# Patient Record
Sex: FEMALE | Race: WHITE | NOT HISPANIC OR LATINO | Employment: FULL TIME | URBAN - METROPOLITAN AREA
[De-identification: names, ages, dates, MRNs, and addresses within clinical notes are randomized per-mention and may not be internally consistent; named-entity substitution may affect disease eponyms.]

---

## 2017-01-18 ENCOUNTER — HOSPITAL ENCOUNTER (OUTPATIENT)
Dept: MAMMOGRAPHY | Facility: MEDICAL CENTER | Age: 52
Discharge: HOME | End: 2017-01-18
Payer: COMMERCIAL

## 2017-01-18 DIAGNOSIS — Z12.31 ENCOUNTER FOR SCREENING MAMMOGRAM FOR MALIGNANT NEOPLASM OF BREAST: ICD-10-CM

## 2017-01-18 PROCEDURE — G0202 SCR MAMMO BI INCL CAD: HCPCS

## 2017-01-18 PROCEDURE — 77063 BREAST TOMOSYNTHESIS BI: CPT

## 2017-03-03 ENCOUNTER — ALLSCRIPTS OFFICE VISIT (OUTPATIENT)
Dept: OTHER | Facility: OTHER | Age: 52
End: 2017-03-03

## 2017-03-03 DIAGNOSIS — Z12.31 ENCOUNTER FOR SCREENING MAMMOGRAM FOR MALIGNANT NEOPLASM OF BREAST: ICD-10-CM

## 2017-03-09 ENCOUNTER — LAB CONVERSION - ENCOUNTER (OUTPATIENT)
Dept: OTHER | Facility: OTHER | Age: 52
End: 2017-03-09

## 2017-03-09 LAB
ADDITIONAL INFORMATION (HISTORICAL): ABNORMAL
ADEQUACY: (HISTORICAL): ABNORMAL
COMMENT (HISTORICAL): ABNORMAL
CYTOTECHNOLOGIST: (HISTORICAL): ABNORMAL
GENERAL CATEGORIZATION (HISTORICAL): ABNORMAL
HPV MRNA E6/E7 (HISTORICAL): NOT DETECTED
INTERPRETATION (HISTORICAL): ABNORMAL
LMP (HISTORICAL): ABNORMAL
PATHOLOGIST (HISTORICAL): ABNORMAL
PREV. BX: (HISTORICAL): ABNORMAL
PREV. PAP (HISTORICAL): ABNORMAL
SOURCE (HISTORICAL): ABNORMAL

## 2018-01-14 VITALS
DIASTOLIC BLOOD PRESSURE: 74 MMHG | WEIGHT: 163 LBS | HEIGHT: 67 IN | SYSTOLIC BLOOD PRESSURE: 118 MMHG | BODY MASS INDEX: 25.58 KG/M2

## 2018-02-12 ENCOUNTER — TRANSCRIBE ORDERS (OUTPATIENT)
Dept: RADIOLOGY | Facility: CLINIC | Age: 53
End: 2018-02-12

## 2018-02-15 ENCOUNTER — HOSPITAL ENCOUNTER (OUTPATIENT)
Dept: RADIOLOGY | Age: 53
Discharge: HOME | End: 2018-02-15
Payer: COMMERCIAL

## 2018-02-15 DIAGNOSIS — Z12.31 ENCOUNTER FOR SCREENING MAMMOGRAM FOR MALIGNANT NEOPLASM OF BREAST: ICD-10-CM

## 2018-02-15 PROCEDURE — 77067 SCR MAMMO BI INCL CAD: CPT

## 2018-02-15 PROCEDURE — 77063 BREAST TOMOSYNTHESIS BI: CPT

## 2018-12-03 ENCOUNTER — HOSPITAL ENCOUNTER (OUTPATIENT)
Dept: RADIOLOGY | Facility: HOSPITAL | Age: 53
Discharge: HOME | End: 2018-12-03
Attending: ORTHOPAEDIC SURGERY
Payer: COMMERCIAL

## 2018-12-03 ENCOUNTER — OFFICE VISIT (OUTPATIENT)
Dept: OBGYN CLINIC | Facility: HOSPITAL | Age: 53
End: 2018-12-03
Payer: COMMERCIAL

## 2018-12-03 VITALS
HEART RATE: 64 BPM | DIASTOLIC BLOOD PRESSURE: 69 MMHG | HEIGHT: 67 IN | SYSTOLIC BLOOD PRESSURE: 116 MMHG | BODY MASS INDEX: 23.23 KG/M2 | WEIGHT: 148 LBS

## 2018-12-03 DIAGNOSIS — M25.512 ACUTE PAIN OF LEFT SHOULDER: Primary | ICD-10-CM

## 2018-12-03 DIAGNOSIS — M25.512 ACUTE PAIN OF LEFT SHOULDER: ICD-10-CM

## 2018-12-03 DIAGNOSIS — M75.102 TEAR OF LEFT ROTATOR CUFF, UNSPECIFIED TEAR EXTENT: ICD-10-CM

## 2018-12-03 PROCEDURE — 73030 X-RAY EXAM OF SHOULDER: CPT

## 2018-12-03 PROCEDURE — 99203 OFFICE O/P NEW LOW 30 MIN: CPT | Performed by: ORTHOPAEDIC SURGERY

## 2018-12-03 RX ORDER — METOPROLOL SUCCINATE 50 MG/1
TABLET, EXTENDED RELEASE ORAL
COMMUNITY
End: 2019-04-29 | Stop reason: SDUPTHER

## 2018-12-03 RX ORDER — ERGOCALCIFEROL (VITAMIN D2) 10 MCG
TABLET ORAL
COMMUNITY

## 2018-12-03 RX ORDER — FLUTICASONE PROPIONATE 50 MCG
SPRAY, SUSPENSION (ML) NASAL
COMMUNITY

## 2018-12-03 RX ORDER — FLUTICASONE PROPIONATE 50 MCG
SPRAY, SUSPENSION (ML) NASAL
Refills: 5 | COMMUNITY
Start: 2018-11-21 | End: 2020-01-28 | Stop reason: SDUPTHER

## 2018-12-03 RX ORDER — LEVOTHYROXINE SODIUM 0.12 MG/1
TABLET ORAL
COMMUNITY
End: 2020-01-28 | Stop reason: DRUGHIGH

## 2018-12-03 NOTE — PROGRESS NOTES
Assessment  left shoulder impingement syndrome  Discussion and Plan:    A mri of the left shoulder was ordered  If mri is denied I will refer to physical therapy and perform a corticosteroid injection of the left shoulder  I will see her back in the office after the mri  Subjective:   Patient ID: Mark Gold is a 46 y o  female in the office for left shoulder pain x 6 months  She was diagnosed with partial rotator cuff tears x 10 years ago which she had two stents of physical therapy previously which relieved her symptoms  She states pain increases at night, usually relieved with rest and HEP  Pain level at worse is 5/10 and at best 0/10  The pain is sharp is localized left glenohumeral joint and is intermittent timing      HPI        The following portions of the patient's history were reviewed and updated as appropriate: allergies, current medications, past family history, past medical history, past social history, past surgical history and problem list     Review of Systems   Constitutional: Negative  HENT: Negative  Eyes: Negative  Respiratory: Negative  Cardiovascular: Negative  Gastrointestinal: Negative  Endocrine: Negative  Genitourinary: Negative  Musculoskeletal: Positive for arthralgias and myalgias  Skin: Negative  Allergic/Immunologic: Negative  Hematological: Negative  Psychiatric/Behavioral: Negative  Objective:  Left Shoulder Exam     Tenderness   The patient is experiencing tenderness in the [facility] joint, acromion      Range of Motion   Active Abduction:                       160  Forward Flexion:                        170  External Rotation:                      80  Internal Rotation 0 degrees:      Lumbar    Muscle Strength   Abduction:            4/5  Internal Rotation:  4/5  External Rotation: 4/5  Supraspinatus:     4/5  Subscapularis:     4/5    Tests   Impingement:   Positive  Dotson:          Positive          Physical Exam Constitutional: She is oriented to person, place, and time  She appears well-developed  HENT:   Head: Normocephalic  Eyes: Conjunctivae are normal    Neck: Neck supple  Cardiovascular: Intact distal pulses  Pulmonary/Chest: Effort normal    Abdominal: She exhibits no distension  Musculoskeletal:   See left shoulder exam in note  Neurological: She is alert and oriented to person, place, and time  Skin: Skin is warm and dry  Psychiatric: She has a normal mood and affect  I have personally reviewed pertinent films in PACS and my interpretation is as follows  Xray right shoulder:  No fracture or dislocation  No degenerative changes present  Scribe Attestation    I,:   Paula Ha am acting as a scribe while in the presence of the attending physician :        I,:   Anand Mendes MD personally performed the services described in this documentation    as scribed in my presence :          The patient has an examination worrisome for a left shoulder rotator cuff tear and given her history of prior partial tears I am concerned that these may have progressed  Given her lack of improvement with the physical therapy exercises she has performed in therapy and on her own and the weakness she demonstrates on exam today I do recommend an MRI scan left shoulder to better assess the structural integrity of the rotator cuff and help guide me in treatment options

## 2018-12-04 DIAGNOSIS — Z86.59 HISTORY OF CLAUSTROPHOBIA: Primary | ICD-10-CM

## 2018-12-04 RX ORDER — LORAZEPAM 1 MG/1
1 TABLET ORAL
Qty: 1 TABLET | Refills: 0 | Status: SHIPPED | OUTPATIENT
Start: 2018-12-04 | End: 2020-01-28 | Stop reason: ALTCHOICE

## 2018-12-16 ENCOUNTER — HOSPITAL ENCOUNTER (OUTPATIENT)
Dept: RADIOLOGY | Facility: HOSPITAL | Age: 53
Discharge: HOME | End: 2018-12-16
Attending: ORTHOPAEDIC SURGERY
Payer: COMMERCIAL

## 2018-12-16 DIAGNOSIS — M75.102 TEAR OF LEFT ROTATOR CUFF, UNSPECIFIED TEAR EXTENT: ICD-10-CM

## 2018-12-16 PROCEDURE — 73221 MRI JOINT UPR EXTREM W/O DYE: CPT

## 2018-12-20 ENCOUNTER — OFFICE VISIT (OUTPATIENT)
Dept: OBGYN CLINIC | Facility: OTHER | Age: 53
End: 2018-12-20
Payer: COMMERCIAL

## 2018-12-20 VITALS — SYSTOLIC BLOOD PRESSURE: 118 MMHG | HEART RATE: 71 BPM | DIASTOLIC BLOOD PRESSURE: 70 MMHG

## 2018-12-20 DIAGNOSIS — M75.42 SUBACROMIAL IMPINGEMENT OF LEFT SHOULDER: Primary | ICD-10-CM

## 2018-12-20 PROCEDURE — 99214 OFFICE O/P EST MOD 30 MIN: CPT | Performed by: ORTHOPAEDIC SURGERY

## 2018-12-20 PROCEDURE — 20610 DRAIN/INJ JOINT/BURSA W/O US: CPT | Performed by: ORTHOPAEDIC SURGERY

## 2018-12-20 RX ORDER — BUPIVACAINE HYDROCHLORIDE 2.5 MG/ML
2 INJECTION, SOLUTION INFILTRATION; PERINEURAL
Status: COMPLETED | OUTPATIENT
Start: 2018-12-20 | End: 2018-12-20

## 2018-12-20 RX ORDER — BETAMETHASONE SODIUM PHOSPHATE AND BETAMETHASONE ACETATE 3; 3 MG/ML; MG/ML
6 INJECTION, SUSPENSION INTRA-ARTICULAR; INTRALESIONAL; INTRAMUSCULAR; SOFT TISSUE
Status: COMPLETED | OUTPATIENT
Start: 2018-12-20 | End: 2018-12-20

## 2018-12-20 RX ADMIN — BETAMETHASONE SODIUM PHOSPHATE AND BETAMETHASONE ACETATE 6 MG: 3; 3 INJECTION, SUSPENSION INTRA-ARTICULAR; INTRALESIONAL; INTRAMUSCULAR; SOFT TISSUE at 08:22

## 2018-12-20 RX ADMIN — BUPIVACAINE HYDROCHLORIDE 2 ML: 2.5 INJECTION, SOLUTION INFILTRATION; PERINEURAL at 08:22

## 2018-12-20 NOTE — PATIENT INSTRUCTIONS

## 2018-12-20 NOTE — PROGRESS NOTES
Assessment  Diagnoses and all orders for this visit:    Subacromial impingement of left shoulder        Discussion and Plan:    I reviewed with the patient the findings of the MRI scan do not concern me for surgical pathology, the interstitial tearing is really equated to tendinitis of the subscapularis and nonsurgical care is certainly indicated  Given her persistent symptoms and her examination consistent with impingement syndrome I did provide her with a subacromial injection with referral to physical therapy, the be happy to re-evaluate her in the future to consider diagnostic arthroscopy if things do not improve but I suspect she will be able to avoid surgery  She is happy with that information and will see her back in the future on an as-needed basis based on her response to the injection and therapy    Subjective:   Patient ID: Aime Mae is a 46 y o  female      The patient returns for follow-up of her left shoulder MRI scan  She had history of partial-thickness rotator cuff tears which were concerned may convert full-thickness tears, her symptoms are unchanged from her previous visit, she feels pain radiating down the lateral aspect of her arm to the deltoid origin but not below, she does state every now and then she has numbness and tingling in her small ring finger which she shakes her arm that does improve  The following portions of the patient's history were reviewed and updated as appropriate: allergies, current medications, past family history, past medical history, past social history, past surgical history and problem list     Review of Systems   Constitutional: Negative for chills and fever  HENT: Negative for hearing loss  Eyes: Negative for visual disturbance  Respiratory: Negative for shortness of breath  Cardiovascular: Negative for chest pain  Gastrointestinal: Negative for abdominal pain     Musculoskeletal:        As reviewed in the HPI   Skin: Negative for rash    Neurological:        As reviewed in the HPI   Psychiatric/Behavioral: Negative for agitation  Objective:  Left Shoulder Exam     Tenderness   None    Range of Motion   Passive Abduction:                    Normal  Forward Flexion:                        170  External Rotation:                      40  Internal Rotation 0 degrees:      Normal    Muscle Strength   Abduction:            4/5  Internal Rotation:  4/5  External Rotation: 4/5    Tests   Impingement:   Positive  Dotson:          Positive  Cross Arm:      Negative  Drop Arm:        Negative  Apprehension: Negative          Physical Exam   Constitutional: She is oriented to person, place, and time  She appears well-developed and well-nourished  HENT:   Head: Normocephalic and atraumatic  Neck: Normal range of motion  Neck supple  Cardiovascular: Normal rate and regular rhythm  Pulmonary/Chest: Effort normal  No respiratory distress  Abdominal: Soft  She exhibits no distension  Neurological: She is alert and oriented to person, place, and time  Skin: Skin is warm and dry  Psychiatric: She has a normal mood and affect  Her behavior is normal    Nursing note and vitals reviewed  I have personally reviewed pertinent films in PACS and my interpretation is as follows  MRI left shoulder shows no evidence of full-thickness or articular/bursal sided partial-thickness tear of the rotator cuff  There is some interstitial tendinitis/tearing of the subscapularis without disruption of the insertion  Long head of biceps is without instability     Mild subacromial bursitis and rotator cuff tendinitis is seen    Large joint arthrocentesis  Date/Time: 12/20/2018 8:22 AM  Consent given by: patient  Timeout: Immediately prior to procedure a time out was called to verify the correct patient, procedure, equipment, support staff and site/side marked as required   Supporting Documentation  Indications: pain   Procedure Details  Location: shoulder - L subacromial bursa  Preparation: Patient was prepped and draped in the usual sterile fashion  Needle size: 22 G  Approach: lateral  Medications administered: 2 mL bupivacaine 0 25 %; 6 mg betamethasone acetate-betamethasone sodium phosphate 6 (3-3) mg/mL    Patient tolerance: patient tolerated the procedure well with no immediate complications  Dressing:  Sterile dressing applied

## 2018-12-31 ENCOUNTER — EVALUATION (OUTPATIENT)
Dept: PHYSICAL THERAPY | Facility: REHABILITATION | Age: 53
End: 2018-12-31
Payer: COMMERCIAL

## 2018-12-31 DIAGNOSIS — M75.42 SUBACROMIAL IMPINGEMENT OF LEFT SHOULDER: Primary | ICD-10-CM

## 2018-12-31 PROCEDURE — G8985 CARRY GOAL STATUS: HCPCS

## 2018-12-31 PROCEDURE — 97140 MANUAL THERAPY 1/> REGIONS: CPT

## 2018-12-31 PROCEDURE — G8984 CARRY CURRENT STATUS: HCPCS

## 2018-12-31 PROCEDURE — 97162 PT EVAL MOD COMPLEX 30 MIN: CPT

## 2018-12-31 PROCEDURE — 97110 THERAPEUTIC EXERCISES: CPT

## 2018-12-31 NOTE — PROGRESS NOTES
PT Evaluation     Today's date: 2018  Patient name: Meng Silver  : 1965  MRN: [id number]  Referring provider: Randy Means MD  Dx:   Encounter Diagnosis     ICD-10-CM    1  Subacromial impingement of left shoulder M75 42 Ambulatory referral to Physical Therapy       Start Time: 1030  Stop Time: 1130  Total time in clinic (min): 60 minutes    Assessment  Assessment details: Patient is a 45 yo female presents with symptoms consistent with L shoulder impingement and biceps tendonitis that started a few months ago  Patient presents with palpable tenderness to the proximal biceps, distal supraspinatus and infraspinatus tendons  Patient also presents with poor posture mechanics, decreased L shoulder abduction ROM, and decreased muscular endurance  Patient has increased pain with ADLs, work related activities and sleeping  Patient noted improvements after manual techniques and exercises were performed  PT will address the noted impairments by performing eccentric strengthening, stretching, functional activities and manual techniques such as mobilizations and STM to allow the patient to return to her PLOF  PT recommended 2x/week for 4-6 weeks c a good prognosis 2* noted improvements after treatment  Impairments: abnormal or restricted ROM, activity intolerance, impaired physical strength, lacks appropriate home exercise program, pain with function, poor posture  and poor body mechanics    Symptom irritability: lowUnderstanding of Dx/Px/POC: good   Prognosis: good    Goals  STG: In four weeks the patient will:    1  Be (I) with her HEP  2  Increase scapular and L shoulder strength to 4/5 MMT score to assist c ADLs  3  Increase L shoulder abduction ROM to 180 degrees to assist c reaching and dressing  LTG: In six weeks, the patient will:    1  Increase FOTO score to 70 to demonstrate improvements in symptoms and function  2  Demonstrate full L shoulder AROM without pain    3  Perform 30 mins of activity without pain  4  Increase L shoulder and scapular strength to 5/5 MMT score to assist c lifting and reaching  Plan  Patient would benefit from: skilled physical therapy  Referral necessary: No  Planned modality interventions: cryotherapy and thermotherapy: hydrocollator packs  Planned therapy interventions: abdominal trunk stabilization, body mechanics training, home exercise program, therapeutic exercise, therapeutic activities, stretching, strengthening, postural training, patient education, neuromuscular re-education, Carpenter taping, massage, manual therapy and joint mobilization  Frequency: 2x week  Duration in visits: 12  Duration in weeks: 6  Plan of Care beginning date: 2018  Plan of Care expiration date: 2019  Treatment plan discussed with: patient        Subjective Evaluation    History of Present Illness  Onset date: Summer 2018  Mechanism of injury: Patient presented to therapy with L shoulder pain  Patient noted that she has had three rounds of therapy for the shoulder and noted the pain started to increase in Summer 2018  Patient noted she started to perform more yard work for her parents and noted an increase in pain  On 18 she had a cortisone injection and patient noted a relief in the shoulder, however it is coming back  She also noted increased neck pain after sleeping on her L shoulder and noted numbness in L 4th and 5th digits  Patient noted increased pain with ADLs, sleeping, moving the mouse at work and cleaning     Recurrent probem    Quality of life: good    Pain  Current pain ratin  At best pain ratin (18-18 2* injection)  At worst pain ratin  Location: anterior L shoulder to biceps to lateral epicondyle  Quality: dull ache  Relieving factors: medications and rest  Aggravating factors: overhead activity and lifting  Progression: worsening (loosing strength in the L shoulder)    Social Support  Steps to enter house: yes (2 [address])  Stairs in house: yes (bedroom on 2nd floor)   Lives in: multiple-level home  Lives with: spouse and adult children    Employment status: working ([address] )  Hand dominance: left  Exercise history: Walk everyday for 30-45 mins      Diagnostic Tests  MRI studies: normal (biceps tedonitis)  Treatments  Previous treatment: physical therapy and medication  Patient Goals  Patient goals for therapy: decreased pain, increased motion, increased strength, independence with ADLs/IADLs and return to sport/leisure activities  Patient goal: "to get stronger and no pain with ADLs "        Objective     Palpation   Left   Tenderness of the biceps, infraspinatus, pectoralis major, pectoralis minor, supraspinatus, teres major and upper trapezius  Trigger point to biceps  Left Shoulder Comments  Left biceps: proximal  Left supraspinatus: distal      Tenderness     Left Shoulder   Tenderness in the biceps tendon (proximal), coracoid process, infraspinatus tendon and supraspinatus tendon       Cervical/Thoracic Screen   Cervical range of motion within normal limits with the following exceptions: Flexion: 100%  Extension: 100%  Rotation: 100%  Sidebendin% pain on L    Neurological Testing     Sensation     Shoulder   Left Shoulder   Intact: light touch    Right Shoulder   Intact: light touch    Active Range of Motion   Left Shoulder   Flexion: 180 degrees   Abduction: 160 degrees   External rotation 45°: 90 degrees   Internal rotation 45°: 70 degrees     Right Shoulder   Normal active range of motion    Strength/Myotome Testing     Left Shoulder     Planes of Motion   Flexion: 4-   Extension: 4   Abduction: 3 (Pain)   External rotation at 0°: 3+   Internal rotation at 0°: 3+ (Pain)     Isolated Muscles   Biceps: 4-   Triceps: 4 (Pain)     Right Shoulder     Planes of Motion   Flexion: 4+   Extension: 4+   Abduction: 4+   External rotation at 0°: 4   Internal rotation at 0°: 4     Isolated Muscles   Biceps: 4+ Triceps: 4+     Tests     Left Shoulder   Positive belly press, empty can, lift-off and Neer's  Negative full can and Raghu's sign         Flowsheet Rows      Most Recent Value   PT/OT G-Codes   Current Score  59   Projected Score  70   FOTO information reviewed  Yes   Assessment Type  Evaluation   G code set  Carrying, Moving & Handling Objects   Carrying, Moving and Handling Objects Current Status ()  CK   Carrying, Moving and Handling Objects Goal Status ()  CJ          Precautions: none    Daily Treatment Diary     Manual  12/31            STM to proximal biceps tendon and post cap 5'            L shoulder distraction and post mob 5'                                                       Exercise Diary  12/31                         UBE nv            pec stretch 3x30"            Shoulder isometrics: ER, IR, Flex, Ext, abd 2x10, 5" hold            Scapular retraction seated X20, 5"            Chin tucks nv            UT stretch 3x30"            Levator scap stretch nv            Rows and extensions nv            sidelying ER nv            Band IR/ER nv            Seated ball roll outs (stretch into shoulder flexion) nv                                                                                                                        Modalities  12/31            HP/CP PRN np

## 2019-01-03 ENCOUNTER — OFFICE VISIT (OUTPATIENT)
Dept: PHYSICAL THERAPY | Facility: REHABILITATION | Age: 54
End: 2019-01-03
Payer: COMMERCIAL

## 2019-01-03 DIAGNOSIS — M75.42 SUBACROMIAL IMPINGEMENT OF LEFT SHOULDER: Primary | ICD-10-CM

## 2019-01-03 PROCEDURE — 97110 THERAPEUTIC EXERCISES: CPT

## 2019-01-03 PROCEDURE — 97140 MANUAL THERAPY 1/> REGIONS: CPT

## 2019-01-03 PROCEDURE — 97112 NEUROMUSCULAR REEDUCATION: CPT

## 2019-01-08 ENCOUNTER — OFFICE VISIT (OUTPATIENT)
Dept: PHYSICAL THERAPY | Facility: REHABILITATION | Age: 54
End: 2019-01-08
Payer: COMMERCIAL

## 2019-01-08 DIAGNOSIS — M75.42 SUBACROMIAL IMPINGEMENT OF LEFT SHOULDER: Primary | ICD-10-CM

## 2019-01-08 PROCEDURE — 97110 THERAPEUTIC EXERCISES: CPT

## 2019-01-08 PROCEDURE — 97140 MANUAL THERAPY 1/> REGIONS: CPT

## 2019-01-08 PROCEDURE — 97112 NEUROMUSCULAR REEDUCATION: CPT

## 2019-01-08 NOTE — PROGRESS NOTES
Daily Note     Today's date: 2019  Patient name: Cathy Feliz  : 1965  MRN: [id number]  Referring provider: Oliverio Reyez MD  Dx:   Encounter Diagnosis     ICD-10-CM    1  Subacromial impingement of left shoulder M75 42                   Subjective: Patient states that her shoulder feels the same, increased pain during the night and upon waking  Objective: See treatment diary below  Precautions: none     Daily Treatment Diary      Manual  12/31  1/3                   STM to proximal biceps tendon and post cap 5'                     L shoulder distraction and post mob 5'                      L PROM   HS                    medial n  glides    HS                  ulnar nerve glides     HS                       Exercise Diary  12/31  1/3  1/8                                         UBE nv  3'/3'                   pec stretch 3x30"                     Shoulder isometrics: ER, IR, Flex, Ext, abd 2x10, 5" hold                     Scapular retraction seated X20, 5"                     Chin tucks nv    5" 2x10                 UT stretch 3x30"  30"x3                   Levator scap stretch nv  30"x3                   Rows and extensions nv  OTB 2x10 ea  OTB 2x10 ea                 sidelying ER nv  5"  2x10                   Band IR/ER nv  OTB 2x10                   Seated ball roll outs (stretch into shoulder flexion) nv                      T's, Y's, I's     2x10 ea                  B/L ER w/ TB     OTB 5" 2x10                  medial n  glides     x10                                                                                                                                               Modalities                       HP/CP PRN np                                                                              Assessment: Good tolerance for new TE added today  Plan: Continue per plan of care

## 2019-01-10 ENCOUNTER — OFFICE VISIT (OUTPATIENT)
Dept: PHYSICAL THERAPY | Facility: REHABILITATION | Age: 54
End: 2019-01-10
Payer: COMMERCIAL

## 2019-01-10 DIAGNOSIS — M75.42 SUBACROMIAL IMPINGEMENT OF LEFT SHOULDER: Primary | ICD-10-CM

## 2019-01-10 PROCEDURE — 97112 NEUROMUSCULAR REEDUCATION: CPT

## 2019-01-10 PROCEDURE — 97140 MANUAL THERAPY 1/> REGIONS: CPT

## 2019-01-10 PROCEDURE — 97110 THERAPEUTIC EXERCISES: CPT

## 2019-01-10 NOTE — PROGRESS NOTES
Daily Note     Today's date: 1/10/2019  Patient name: Corinna Halsted  : 1965  MRN: [id number]  Referring provider: Jose Pearson MD  Dx:   Encounter Diagnosis     ICD-10-CM    1  Subacromial impingement of left shoulder M75 42                   Subjective: Patient reports soreness after LV but today was the first day she woke up without tingling and numbness in 4th & 5th digit         Objective: See treatment diary below  Precautions: none     Daily Treatment Diary      Manual  12/31  1/3  1/8 1/10                STM to proximal biceps tendon and post cap 5'                     L shoulder distraction and post mob 5'                      L PROM   HS                    medial n  glides    HS  HS                ulnar nerve glides     HS  HS                     Exercise Diary  12/31  1/3  1/8  1/10                                       UBE nv  3'/3'  3'/3'  3'/3'               pec stretch 3x30"                     Shoulder isometrics: ER, IR, Flex, Ext, abd 2x10, 5" hold                     Scapular retraction seated X20, 5"                     Chin tucks nv    5" 2x10  5" 2x10               UT stretch 3x30"  30"x3                   Levator scap stretch nv  30"x3                   Rows and extensions nv  OTB 2x10 ea  OTB 2x10 ea  OTB 3x10 ea               sidelying ER nv  5"  2x10                   Band IR/ER nv  OTB 2x10                   Seated ball roll outs (stretch into shoulder flexion) nv                      T's, Y's, I's     2x10 ea 3x10                B/L ER w/ TB     OTB 5" 2x10  OTB  5" 2x10                medial n  glides     x10  x10                scap punches       3" 3x10                                                                                                                     Modalities                       HP/CP PRN np                                                                              Assessment: Reports soreness post session, no c/o increased sx/       Plan: Continue per plan of care

## 2019-01-15 ENCOUNTER — OFFICE VISIT (OUTPATIENT)
Dept: PHYSICAL THERAPY | Facility: REHABILITATION | Age: 54
End: 2019-01-15
Payer: COMMERCIAL

## 2019-01-15 DIAGNOSIS — M75.42 SUBACROMIAL IMPINGEMENT OF LEFT SHOULDER: Primary | ICD-10-CM

## 2019-01-15 PROCEDURE — 97112 NEUROMUSCULAR REEDUCATION: CPT

## 2019-01-15 PROCEDURE — 97110 THERAPEUTIC EXERCISES: CPT

## 2019-01-15 PROCEDURE — 97140 MANUAL THERAPY 1/> REGIONS: CPT

## 2019-01-15 NOTE — PROGRESS NOTES
Daily Note     Today's date: 1/15/2019  Patient name: Corinna Halsted  : 1965  MRN: [id number]  Referring provider: Jose Pearson MD  Dx:   Encounter Diagnosis     ICD-10-CM    1  Subacromial impingement of left shoulder M75 42                   Subjective: Patient reports that her shoulder has been feeling pretty good, no new complaints  Objective: See treatment diary below  Precautions: none     Daily Treatment Diary      Manual  12/31  1/3  1/8 1/10   1/15             STM to proximal biceps tendon and post cap 5'                     L shoulder distraction and post mob 5'                      L PROM   HS                    medial n  glides    HS  HS  HS              ulnar nerve glides     HS  HS  HS                   Exercise Diary  12/31  1/3  1/8  1/10  1/15                                     UBE nv  3'/3'  3'/3'  3'/3'  4'/4'             pec stretch 3x30"                     Shoulder isometrics: ER, IR, Flex, Ext, abd 2x10, 5" hold                     Scapular retraction seated X20, 5"                     Chin tucks nv    5" 2x10  5" 2x10  5" 3x10             UT stretch 3x30"  30"x3                   Levator scap stretch nv  30"x3                   Rows and extensions nv  OTB 2x10 ea  OTB 2x10 ea  OTB 3x10 ea  OTB  3x10 ea             sidelying ER nv  5"  2x10                   Band IR/ER nv  OTB 2x10                   Seated ball roll outs (stretch into shoulder flexion) nv                      T's, Y's, I's     2x10 ea 3x10  3x10              B/L ER w/ TB     OTB 5" 2x10  OTB  5" 2x10  OTB  5" 2x10              medial n  glides     x10  x10  x10              scap punches       3" 3x10  2# 3"  3x10                                                                                                                   Modalities                       HP/CP PRN np                                                                              Assessment: Patient is making good progress         Plan: Continue per plan of care

## 2019-01-17 ENCOUNTER — OFFICE VISIT (OUTPATIENT)
Dept: PHYSICAL THERAPY | Facility: REHABILITATION | Age: 54
End: 2019-01-17
Payer: COMMERCIAL

## 2019-01-17 DIAGNOSIS — M75.42 SUBACROMIAL IMPINGEMENT OF LEFT SHOULDER: Primary | ICD-10-CM

## 2019-01-17 PROCEDURE — G8990 OTHER PT/OT CURRENT STATUS: HCPCS

## 2019-01-17 PROCEDURE — 97110 THERAPEUTIC EXERCISES: CPT

## 2019-01-17 PROCEDURE — 97112 NEUROMUSCULAR REEDUCATION: CPT

## 2019-01-17 PROCEDURE — G8991 OTHER PT/OT GOAL STATUS: HCPCS

## 2019-01-17 PROCEDURE — 97140 MANUAL THERAPY 1/> REGIONS: CPT

## 2019-01-17 NOTE — PROGRESS NOTES
Daily Note     Today's date: 2019  Patient name: Anastacio Bailey  : 1965  MRN: [id number]  Referring provider: Zenon Hernandes MD  Dx:   Encounter Diagnosis     ICD-10-CM    1  Subacromial impingement of left shoulder M75 42                   Subjective: Patient reports that her shoulder is a little stiff in the morning but it loosens up  Objective: See treatment diary below  Precautions: none     Daily Treatment Diary      Manual  12/31  1/3  1/8 1/10   1/15  1/17           STM to proximal biceps tendon and post cap 5'                     L shoulder distraction and post mob 5'                      L PROM   HS                    medial n  glides    HS  HS  HS  HS            ulnar nerve glides     HS  HS  HS  HS                 Exercise Diary  12/31  1/3  1/8  1/10  1/15  1/17                                   UBE nv  3'/3'  3'/3'  3'/3'  4'/4'  4'/4'           pec stretch 3x30"                     Chin tucks nv    5" 2x10  5" 2x10  5" 3x10  5" 2x10           UT stretch 3x30"  30"x3                   Levator scap stretch nv  30"x3                   Rows and extensions nv  OTB 2x10 ea  OTB 2x10 ea  OTB 3x10 ea  OTB  3x10 ea  GTB  3x10 ea           sidelying ER nv  5"  2x10                   Band IR/ER nv  OTB 2x10                   Seated ball roll outs (stretch into shoulder flexion) nv                      T's, Y's, I's     2x10 ea 3x10  3x10  3x10            B/L ER w/ TB     OTB 5" 2x10  OTB  5" 2x10  OTB  5" 2x10  OTB 5"  2x10            medial n  glides     x10  x10  x10  x10            scap punches       3" 3x10  2# 3"  3x10  2# 3" 3x10                                                                                                                 Modalities                       HP/CP PRN np                                                                              Assessment: Good tolerance for all TE as listed, reports no changes in sx during or post tx         Plan: Continue per plan of care

## 2019-01-22 ENCOUNTER — OFFICE VISIT (OUTPATIENT)
Dept: PHYSICAL THERAPY | Facility: REHABILITATION | Age: 54
End: 2019-01-22
Payer: COMMERCIAL

## 2019-01-22 DIAGNOSIS — M75.42 SUBACROMIAL IMPINGEMENT OF LEFT SHOULDER: Primary | ICD-10-CM

## 2019-01-22 PROCEDURE — 97112 NEUROMUSCULAR REEDUCATION: CPT

## 2019-01-22 PROCEDURE — 97110 THERAPEUTIC EXERCISES: CPT

## 2019-01-22 PROCEDURE — 97140 MANUAL THERAPY 1/> REGIONS: CPT

## 2019-01-22 NOTE — PROGRESS NOTES
Daily Note     Today's date: 2019  Patient name: Linh Sesay  : 1965  MRN: [id number]  Referring provider: Lazarus Hake, MD  Dx:   Encounter Diagnosis     ICD-10-CM    1  Subacromial impingement of left shoulder M75 42                   Subjective: Pt reports feeling sore before Tx due to shoveling snow  Patient treated by Anson Avitia under my direct supervision, Pennie Loya, PTA  Objective: See treatment diary below  Precautions: none     Daily Treatment Diary      Manual  12/31  1/3  1/8 1/10   1/15  1/17  1/21         STM to proximal biceps tendon and post cap 5'                     L shoulder distraction and post mob 5'                      L PROM   HS                    medial n  glides    HS  HS  HS  HS  HS          ulnar nerve glides     HS  HS  HS  HS  HS               Exercise Diary  12/31  1/3  1/8  1/10  1/15  1/17  1/21                                 UBE nv  3'/3'  3'/3'  3'/3'  4'/4'  4'/4'  4'/4'         pec stretch 3x30"            3x30"         Chin tucks nv    5" 2x10  5" 2x10  5" 3x10  5" 2x10  5"   2x10         UT stretch 3x30"  30"x3                   Levator scap stretch nv  30"x3                   Rows and extensions nv  OTB 2x10 ea  OTB 2x10 ea  OTB 3x10 ea  OTB  3x10 ea  GTB  3x10 ea GTB  3x10  ea         sidelying ER nv  5"  2x10                   Band IR/ER nv  OTB 2x10                    T's, Y's, I's     2x10 ea 3x10  3x10  3x10  3x10  ea          B/L ER w/ TB     OTB 5" 2x10  OTB  5" 2x10  OTB  5" 2x10  OTB 5"  2x10  OTB  5"  2x10          medial n  glides     x10  x10  x10  x10  x10          scap punches       3" 3x10  2# 3"  3x10  2# 3" 3x10  3# 3"  3x10                                                                                                               Modalities                       HP/CP PRN np                                                                              Assessment: Pt progressed to a 3# weight for scap punches  Pt instructed in ulnar glides to perform at home  Pt had no exacerbation of pain during Tx  Plan: Continue per plan of care

## 2019-01-24 ENCOUNTER — EVALUATION (OUTPATIENT)
Dept: PHYSICAL THERAPY | Facility: REHABILITATION | Age: 54
End: 2019-01-24
Payer: COMMERCIAL

## 2019-01-24 DIAGNOSIS — M75.42 SUBACROMIAL IMPINGEMENT OF LEFT SHOULDER: Primary | ICD-10-CM

## 2019-01-24 PROCEDURE — 97110 THERAPEUTIC EXERCISES: CPT | Performed by: PHYSICAL THERAPIST

## 2019-01-24 PROCEDURE — 97140 MANUAL THERAPY 1/> REGIONS: CPT | Performed by: PHYSICAL THERAPIST

## 2019-01-24 NOTE — PROGRESS NOTES
Daily Note     Today's date: 2019  Patient name: Joselin Reza  : 1965  MRN: [id number]  Referring provider: Aleena Hatch MD  Dx:   No diagnosis found  Subjective: Reports continued improvement in UE numbness  Objective: See treatment diary below  Precautions: none     Daily Treatment Diary      Manual  12/31  1/3  1/8 1/10   1/15  1/17  1/21  1/24       STM to proximal biceps tendon and post cap 5'                     L shoulder distraction and post mob 5'                      L PROM   HS            SAHIL        medial n  glides    HS  HS  HS  HS  HS SAHIL        ulnar nerve glides     HS  HS  HS  HS  HS  SAHIL             Exercise Diary  12/31  1/3  1/8  1/10  1/15  1/17  1/21  1/24                               UBE nv  3'/3'  3'/3'  3'/3'  4'/4'  4'/4'  4'/4' 10'       pec stretch 3x30"            3x30"         Chin tucks nv    5" 2x10  5" 2x10  5" 3x10  5" 2x10  5"   2x10  5" 2x10       UT stretch 3x30"  30"x3                   Levator scap stretch nv  30"x3                   Rows and extensions nv  OTB 2x10 ea  OTB 2x10 ea  OTB 3x10 ea  OTB  3x10 ea  GTB  3x10 ea GTB  3x10  ea  GTB 3x10 ea       sidelying ER nv  5"  2x10            2# 2x10       Band IR/ER nv  OTB 2x10                    T's, Y's, I's     2x10 ea 3x10  3x10  3x10  3x10  ea          B/L ER w/ TB     OTB 5" 2x10  OTB  5" 2x10  OTB  5" 2x10  OTB 5"  2x10  OTB  5"  2x10  OTB 5" 2x10        medial n  glides     x10  x10  x10  x10  x10  x10        scap punches       3" 3x10  2# 3"  3x10  2# 3" 3x10  3# 3"  3x10  3# 3x10                                                                                                             Modalities                       HP/CP PRN np                                                                              Assessment: Progressing well with all aspects  Plan: Continue per plan of care

## 2019-01-29 ENCOUNTER — OFFICE VISIT (OUTPATIENT)
Dept: PHYSICAL THERAPY | Facility: REHABILITATION | Age: 54
End: 2019-01-29
Payer: COMMERCIAL

## 2019-01-29 DIAGNOSIS — M75.42 SUBACROMIAL IMPINGEMENT OF LEFT SHOULDER: Primary | ICD-10-CM

## 2019-01-29 PROCEDURE — 97110 THERAPEUTIC EXERCISES: CPT

## 2019-01-29 PROCEDURE — 97112 NEUROMUSCULAR REEDUCATION: CPT

## 2019-01-29 PROCEDURE — 97140 MANUAL THERAPY 1/> REGIONS: CPT

## 2019-01-31 ENCOUNTER — OFFICE VISIT (OUTPATIENT)
Dept: PHYSICAL THERAPY | Facility: REHABILITATION | Age: 54
End: 2019-01-31
Payer: COMMERCIAL

## 2019-01-31 ENCOUNTER — APPOINTMENT (OUTPATIENT)
Dept: PHYSICAL THERAPY | Facility: REHABILITATION | Age: 54
End: 2019-01-31
Payer: COMMERCIAL

## 2019-01-31 DIAGNOSIS — M75.42 SUBACROMIAL IMPINGEMENT OF LEFT SHOULDER: Primary | ICD-10-CM

## 2019-01-31 PROCEDURE — 97110 THERAPEUTIC EXERCISES: CPT

## 2019-01-31 PROCEDURE — 97140 MANUAL THERAPY 1/> REGIONS: CPT

## 2019-01-31 PROCEDURE — 97112 NEUROMUSCULAR REEDUCATION: CPT

## 2019-01-31 NOTE — PROGRESS NOTES
Daily Note     Today's date: 2019  Patient name: Hernan Vogt  : 1965  MRN: [id number]  Referring provider: Marisela Mccauley MD  Dx:   Encounter Diagnosis     ICD-10-CM    1  Subacromial impingement of left shoulder M75 42                   Subjective: Patient reports that her shoulder is feeling really good today  Pt reports that she was able to shovel snow without difficulty         Objective: See treatment diary below  Precautions: none     Daily Treatment Diary      Manual  12/31  1/3  1/8 1/10   1/15  1/17  1/21  1/24  1/29  1/31   STM to proximal biceps tendon and post cap 5'                     L shoulder distraction and post mob 5'                      L PROM   HS            SAHIL        medial n  glides    HS  HS  HS  HS  HS SAHIL  HS  AC    ulnar nerve glides     HS  HS  HS  HS  HS  SAHIL  HS [facility]         Exercise Diary  12/31  1/3  1/8  1/10  1/15  1/17  1/21  1/24 1/29  1/31                           UBE nv  3'/3'  3'/3'  3'/3'  4'/4'  4'/4'  4'/4' 10'  4'/4'  4'/4'   pec stretch 3x30"            3x30"    3x30"  3x30"   Chin tucks nv    5" 2x10  5" 2x10  5" 3x10  5" 2x10  5"   2x10  5" 2x10  5" 2x10  5"  2x10   UT stretch 3x30"  30"x3                30"x5   Levator scap stretch nv  30"x3                30"x3   Rows and extensions nv  OTB 2x10 ea  OTB 2x10 ea  OTB 3x10 ea  OTB  3x10 ea  GTB  3x10 ea GTB  3x10  ea  GTB 3x10 ea  GTB 3x10 ea  GTB  3x10   sidelying ER nv  5"  2x10            2# 2x10    3#  2x10   Band IR/ER nv  OTB 2x10                    T's, Y's, I's     2x10 ea 3x10  3x10  3x10  3x10  ea      2x10    B/L ER w/ TB     OTB 5" 2x10  OTB  5" 2x10  OTB  5" 2x10  OTB 5"  2x10  OTB  5"  2x10  OTB 5" 2x10  OTB 5" 3x10  OTB  5"  3x10    medial n  glides     x10  x10  x10  x10  x10  x10  x10      scap punches       3" 3x10  2# 3"  3x10  2# 3" 3x10  3# 3"  3x10  3# 3x10  3# 3"  3x10  3# 3"  3x10                                                                                                       Modalities  12/31                     HP/CP PRN np                                                                       Assessment: Tolerated treatment well  Patient exhibited good technique with therapeutic exercises and experienced no pain  Plan: Continue per plan of care   Will D/C to [facility] next week

## 2019-02-05 ENCOUNTER — OFFICE VISIT (OUTPATIENT)
Dept: PHYSICAL THERAPY | Facility: REHABILITATION | Age: 54
End: 2019-02-05
Payer: COMMERCIAL

## 2019-02-05 DIAGNOSIS — M75.42 SUBACROMIAL IMPINGEMENT OF LEFT SHOULDER: Primary | ICD-10-CM

## 2019-02-05 PROCEDURE — 97110 THERAPEUTIC EXERCISES: CPT

## 2019-02-05 PROCEDURE — 97140 MANUAL THERAPY 1/> REGIONS: CPT

## 2019-02-05 PROCEDURE — 97112 NEUROMUSCULAR REEDUCATION: CPT

## 2019-02-05 NOTE — PROGRESS NOTES
Daily Note     Today's date: 2019  Patient name: West Denis  : 1965  MRN: [id number]  Referring provider: Tod Morales MD  Dx:   Encounter Diagnosis     ICD-10-CM    1  Subacromial impingement of left shoulder M75 42        Start Time: 0900  Stop Time: 0945  Total time in clinic (min): 45 minutes    Subjective: Patient continues to feel great and has no pain  Patient does report occasional elbow and triceps discomfort which the patient attributes to her deck ergonomics       Objective: See treatment diary below  Precautions: none     Daily Treatment Diary      Manual  2/5   1/8 1/10   1/15  1/17  1/21  1/24  1/29  1/31   STM to proximal biceps tendon and post cap                     L shoulder distraction and post mob                      L PROM              SAHIL        medial n  glides    HS  HS  HS  HS  HS SAHIL  HS  AC    ulnar nerve glides     HS  HS  HS  HS  HS  SAHIL  HS [facility]       Exercise Diary  2/5   1/8  1/10  1/15  1/17  1/21  1/24 1/29  1/31                         UBE 4'/4'   3'/3'  3'/3'  4'/4'  4'/4'  4'/4' 10'  4'/4'  4'/4'   pec stretch 3x30"           3x30"    3x30"  3x30"   Chin tucks 5"  2x10   5" 2x10  5" 2x10  5" 3x10  5" 2x10  5"   2x10  5" 2x10  5" 2x10  5"  2x10   UT stretch 30"x5                 30"x5   Levator scap stretch 30"x5                 30"x3   Rows and extensions GTB 3x10   OTB 2x10 ea  OTB 3x10 ea  OTB  3x10 ea  GTB  3x10 ea GTB  3x10  ea  GTB 3x10 ea  GTB 3x10 ea  GTB  3x10   sidelying ER 3#  3x10             2# 2x10    3#  2x10   Band IR/ER                      T's, Y's, I's 3x10  ea  2x10 ea 3x10  3x10  3x10  3x10  ea      2x10    B/L ER w/ TB GTB  5"   3x10  OTB 5" 2x10  OTB  5" 2x10  OTB  5" 2x10  OTB 5"  2x10  OTB  5"  2x10  OTB 5" 2x10  OTB 5" 3x10  OTB  5"  3x10    medial n  glides x10  x10  x10  x10  x10  x10  x10  x10      scap punches 3"  3x10    3" 3x10  2# 3"  3x10  2# 3" 3x10  3# 3"  3x10  3# 3x10  3# 3"  3x10  3# 3"  3x10                                                                                                    Modalities  12/31                     HP/CP PRN np                                                                       Assessment: Tolerated treatment well  Patient demonstrated good endurance to TE today and required min verbal cueing  Educated patient how to perform self ulnar n glide  Patient demonstrated good comprehension of TE today and will complete it at home with no issues  Plan: Continue per plan of care  Will D/C to [facility] next week

## 2019-02-08 ENCOUNTER — OFFICE VISIT (OUTPATIENT)
Dept: PHYSICAL THERAPY | Facility: REHABILITATION | Age: 54
End: 2019-02-08
Payer: COMMERCIAL

## 2019-02-08 DIAGNOSIS — M75.42 SUBACROMIAL IMPINGEMENT OF LEFT SHOULDER: Primary | ICD-10-CM

## 2019-02-08 PROCEDURE — 97110 THERAPEUTIC EXERCISES: CPT | Performed by: PHYSICAL THERAPIST

## 2019-02-08 PROCEDURE — 97140 MANUAL THERAPY 1/> REGIONS: CPT | Performed by: PHYSICAL THERAPIST

## 2019-02-08 NOTE — PROGRESS NOTES
PT Discharge    Today's date: 2019  Patient name: Lalito Herring  : 1965  MRN: [id number]  Referring provider: Alice Ochoa MD  Dx: No diagnosis found  Assessment  Assessment details: All formal PT goals have been achieved  Patient to continue with [facility]  Understanding of Dx/Px/POC: excellent  Plan  Plan details: D/C to [facility]  Planned therapy interventions: home exercise program        Subjective Evaluation    History of Present Illness  Mechanism of injury: No objective complaints  Objective     Active Range of Motion   Cervical/Thoracic Spine     Normal active range of motion    Strength/Myotome Testing   Cervical Spine     Left   Normal strength    Right   Normal strength    Tests   Cervical     Right   Negative Spurling's Test B  Right Shoulder   Negative ULTT1 and ULTT4             Manual  /5  2/7  1/8 1/10   1/15  1/17  1/21  1/24  1/29  1/31   STM to proximal biceps tendon and post cap                       L shoulder distraction and post mob                        L PROM                SAHIL        medial n  glides    SAHIL HS  HS  HS  HS  HS SAHIL  HS  AC    ulnar nerve glides    SAHIL HS  HS  HS  HS  HS  SAHIL  HS  AC       Exercise Diary  2/5  2/7  1/8  1/10  1/15  1/17  1/21  1/24 1/29  1/31                           UBE 4'/4'  7'  3'/3'  3'/3'  4'/4'  4'/4'  4'/4' 10'  4'/4'  4'/4'   pec stretch 3x30"  3x30"          3x30"    3x30"  3x30"   Chin tucks 5"  2x10    5" 2x10  5" 2x10  5" 3x10  5" 2x10  5"   2x10  5" 2x10  5" 2x10  5"  2x10   UT stretch 30"x5                  30"x5   Levator scap stretch 30"x5                  30"x3   Rows and extensions GTB 3x10 GTB 2x15  OTB 2x10 ea  OTB 3x10 ea  OTB  3x10 ea  GTB  3x10 ea GTB  3x10  ea  GTB 3x10 ea  GTB 3x10 ea  GTB  3x10   sidelying ER 3#  3x10              2# 2x10    3#  2x10   Band IR/ER                        T's, Y's, I's 3x10  ea 2x10 2x10 ea 3x10  3x10  3x10  3x10  ea      2x10    B/L ER w/ TB GTB  5"   3x10  GTB 5" 2x10 OTB 5" 2x10  OTB  5" 2x10  OTB  5" 2x10  OTB 5"  2x10  OTB  5"  2x10  OTB 5" 2x10  OTB 5" 3x10  OTB  5"  3x10    medial n  glides x10   x10  x10  x10  x10  x10  x10  x10      scap punches 3"  3x10 5# 3x10   3" 3x10  2# 3"  3x10  2# 3" 3x10  3# 3"  3x10  3# 3x10  3# 3"  3x10  3# 3"  3x10

## 2019-02-18 ENCOUNTER — HOSPITAL ENCOUNTER (OUTPATIENT)
Dept: RADIOLOGY | Age: 54
Discharge: HOME | End: 2019-02-18
Payer: COMMERCIAL

## 2019-02-18 VITALS — HEIGHT: 67 IN | BODY MASS INDEX: 23.23 KG/M2 | WEIGHT: 148 LBS

## 2019-02-18 DIAGNOSIS — Z12.31 ENCOUNTER FOR SCREENING MAMMOGRAM FOR MALIGNANT NEOPLASM OF BREAST: ICD-10-CM

## 2019-02-18 PROCEDURE — 77063 BREAST TOMOSYNTHESIS BI: CPT

## 2019-02-18 PROCEDURE — 77067 SCR MAMMO BI INCL CAD: CPT

## 2019-04-29 DIAGNOSIS — R00.2 PALPITATION: Primary | ICD-10-CM

## 2019-04-29 RX ORDER — METOPROLOL SUCCINATE 50 MG/1
TABLET, EXTENDED RELEASE ORAL
Qty: 90 TABLET | Refills: 3 | Status: SHIPPED | OUTPATIENT
Start: 2019-04-29 | End: 2020-04-09 | Stop reason: HOSPADM

## 2019-08-27 ENCOUNTER — APPOINTMENT (EMERGENCY)
Dept: RADIOLOGY | Facility: HOSPITAL | Age: 54
End: 2019-08-27
Payer: COMMERCIAL

## 2019-08-27 ENCOUNTER — OFFICE VISIT (OUTPATIENT)
Dept: URGENT CARE | Age: 54
End: 2019-08-27
Payer: COMMERCIAL

## 2019-08-27 ENCOUNTER — HOSPITAL ENCOUNTER (EMERGENCY)
Facility: HOSPITAL | Age: 54
Discharge: HOME | End: 2019-08-28
Attending: EMERGENCY MEDICINE | Admitting: EMERGENCY MEDICINE
Payer: COMMERCIAL

## 2019-08-27 VITALS
HEART RATE: 71 BPM | TEMPERATURE: 98.1 F | HEIGHT: 67 IN | SYSTOLIC BLOOD PRESSURE: 131 MMHG | OXYGEN SATURATION: 100 % | RESPIRATION RATE: 16 BRPM | WEIGHT: 158 LBS | DIASTOLIC BLOOD PRESSURE: 64 MMHG | BODY MASS INDEX: 24.8 KG/M2

## 2019-08-27 DIAGNOSIS — S09.93XA FACIAL INJURY, INITIAL ENCOUNTER: Primary | ICD-10-CM
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
## 2019-08-27 DIAGNOSIS — M25.512 ACUTE PAIN OF LEFT SHOULDER: ICD-10-CM

## 2019-08-27 DIAGNOSIS — S09.93XA INJURY OF TOOTH, INITIAL ENCOUNTER: ICD-10-CM

## 2019-08-27 DIAGNOSIS — S01.511A LIP LACERATION, INITIAL ENCOUNTER: ICD-10-CM
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
## 2019-08-27 DIAGNOSIS — S09.90XA INJURY OF HEAD, INITIAL ENCOUNTER: ICD-10-CM

## 2019-08-27 PROCEDURE — 70450 CT HEAD/BRAIN W/O DYE: CPT

## 2019-08-27 PROCEDURE — 70486 CT MAXILLOFACIAL W/O DYE: CPT

## 2019-08-27 PROCEDURE — 90715 TDAP VACCINE 7 YRS/> IM: CPT | Performed by: EMERGENCY MEDICINE

## 2019-08-27 PROCEDURE — 99213 OFFICE O/P EST LOW 20 MIN: CPT | Performed by: PHYSICIAN ASSISTANT

## 2019-08-27 PROCEDURE — 99284 EMERGENCY DEPT VISIT MOD MDM: CPT

## 2019-08-27 PROCEDURE — 99282 EMERGENCY DEPT VISIT SF MDM: CPT | Performed by: EMERGENCY MEDICINE

## 2019-08-27 PROCEDURE — 90471 IMMUNIZATION ADMIN: CPT

## 2019-08-27 RX ORDER — UREA 10 %
1 LOTION (ML) TOPICAL DAILY
COMMUNITY

## 2019-08-27 RX ADMIN — TETANUS TOXOID, REDUCED DIPHTHERIA TOXOID AND ACELLULAR PERTUSSIS VACCINE, ADSORBED 0.5 ML: 5; 2.5; 8; 8; 2.5 SUSPENSION INTRAMUSCULAR at 22:48

## 2019-08-28 VITALS
HEART RATE: 70 BPM | SYSTOLIC BLOOD PRESSURE: 148 MMHG | HEIGHT: 67 IN | RESPIRATION RATE: 20 BRPM | WEIGHT: 158 LBS | TEMPERATURE: 98.1 F | BODY MASS INDEX: 24.8 KG/M2 | OXYGEN SATURATION: 98 % | DIASTOLIC BLOOD PRESSURE: 78 MMHG

## 2019-08-28 NOTE — PROGRESS NOTES
[facility]        NAME: Lupe Barnett is a 53 y o  female  : 1965    MRN: [id number]  DATE: 2019  TIME: 8:52 PM    Assessment and Plan   Facial injury, initial encounter [M80 81UL]  1  Facial injury, initial encounter  Transfer to other facility   2  Injury of head, initial encounter  Transfer to other facility   3  Fall, initial encounter  Transfer to other facility   4  Lip laceration, initial encounter  Transfer to other facility   5  Injury of tooth, initial encounter  Transfer to other facility   6  Acute pain of left shoulder       Facial/head injury without loss of consciousness  Tooth/mouth injury and laceration  Left shoulder pain, left knee pain  Facial pain, denies headache or dizziness  Patient Instructions     Patient would like to go to [facility] via private vehicle, she declines ambulance transfer  She would like her daughter to drive her  Please go to the [facility] Emergency Department now for further evaluation and treatment- hospital address verified with the patient  Patient agreed to go immediately to the ED  Chief Complaint     Chief Complaint   Patient presents with    Nose Bleed     Pt was on a walk this evening and tripped on the side walk  She hit her face, L hand and L knee on the side walk  She has multiple abraisions to the L side of her face, cut her upper lip, has a nose bleed and has apraisions to her L knee and L palm  Also complaining of pain to her L shoulder   Multiple Abrasions         History of Present Illness       23-year-old female presents after a fall that occurred just prior to arrival   She states it was a mechanical fall, states she was walking she tripped over the uneven pavement  States she was holding a bag so she could not catch herself, she landed directly on the left side of her face/head  Patient also notes she did hit her left shoulder and her left knee    She is complaining of tooth pain, nose pain, left facial pain as well as left shoulder and knee pain  Did not try anything for but they came straight here  She did states she got the nose bleed to stop  She denies any headaches, vision changes, numbness, tingling, weakness, nausea, vomiting, chest pain, shortness of breath or other complaints at this time  Denies any blood thinner use  Denies any loss of consciousness  Unknown her last tetanus vaccine      Review of Systems   Review of Systems   Constitutional: Negative for activity change, appetite change, chills, fatigue and fever  HENT: Positive for dental problem, facial swelling and nosebleeds  Eyes: Negative for photophobia and visual disturbance  Respiratory: Negative for cough and shortness of breath  Cardiovascular: Negative for chest pain  Gastrointestinal: Negative for abdominal pain, diarrhea, nausea and vomiting  Musculoskeletal: Negative for back pain, neck pain and neck stiffness  Skin: Positive for rash and wound  Neurological: Negative for dizziness, syncope, light-headedness, numbness and headaches  All other systems reviewed and are negative          Current Medications       Current Outpatient Medications:     calcium carbonate (OS-MAGDALENA) 1250 (500 Ca) MG chewable tablet, Chew 1 tablet daily, Disp: , Rfl:     fluticasone (FLONASE) 50 mcg/act nasal spray, into each nostril, Disp: , Rfl:     levothyroxine (SYNTHROID) 125 mcg tablet, Take by mouth, Disp: , Rfl:     metoprolol succinate (TOPROL-XL) 50 mg 24 hr tablet, TAKE 1 TABLET DAILY, Disp: 90 tablet, Rfl: 3    Multiple Vitamin (DAILY VALUE MULTIVITAMIN) TABS, Take by mouth, Disp: , Rfl:     fluticasone (FLONASE) 50 mcg/act nasal spray, SPRAY 2 SPRAYS INTO EACH NOSTRIL EVERY DAY, Disp: , Rfl: 5    LORazepam (ATIVAN) 1 mg tablet, Take 1 tablet (1 mg total) by mouth once in imaging for anxiety for up to 1 dose (Patient not taking: Reported on 8/27/2019), Disp: 1 tablet, Rfl: 0    Current Allergies     Allergies as of 08/27/2019 - Reviewed 08/27/2019   Allergen Reaction Noted    Ciprofloxacin  03/03/2017    Erythromycin  03/03/2017    Penicillins  03/03/2017    Sulfa antibiotics  03/03/2017            The following portions of the patient's history were reviewed and updated as appropriate: allergies, current medications, past family history, past medical history, past social history, past surgical history and problem list      Past Medical History:   Diagnosis Date    Disease of thyroid gland     Tachycardia        History reviewed  No pertinent surgical history  Family History   Problem Relation Age of Onset    Colon cancer Father 67    No Known Problems Mother          Medications have been verified  Objective   /64 (BP Location: Left arm, Patient Position: Sitting)   Pulse 71   Temp 98 1 °F (36 7 °C) (Temporal)   Resp 16   Ht 5' 7" (1 702 m)   Wt 71 7 kg (158 lb)   LMP 07/06/2018 (Exact Date)   SpO2 100%   BMI 24 75 kg/m²        Physical Exam     Physical Exam   Constitutional: She is oriented to person, place, and time  She appears well-developed and well-nourished  No distress  HENT:   Head: Head is with abrasion and with contusion  Head is without raccoon's eyes, without Sullivan's sign and without laceration  Right Ear: Tympanic membrane, external ear and ear canal normal  No hemotympanum  Left Ear: Tympanic membrane, external ear and ear canal normal  No hemotympanum  Mouth/Throat: Uvula is midline and oropharynx is clear and moist  Lacerations (Gaping laceration to the  inner left upper lip) present  Dried blood in bilateral nares, no current nose bleeding   Eyes: Pupils are equal, round, and reactive to light  Conjunctivae and EOM are normal    No nystagmus, no pain with EOMs   Neck: No spinous process tenderness present  Cardiovascular: Normal rate, regular rhythm and normal heart sounds     Pulmonary/Chest: Effort normal and breath sounds normal  She has no wheezes  Musculoskeletal:   Moving all 4 extremities  Scattered abrasion on left shoulder and left knee  Neurological: She is alert and oriented to person, place, and time  She has normal reflexes  She is not disoriented  No sensory deficit  Coordination normal  GCS eye subscore is 4  GCS verbal subscore is 5  GCS motor subscore is 6  No focal neurological deficits  Psychiatric: She has a normal mood and affect  Her behavior is normal    Nursing note and vitals reviewed

## 2019-08-28 NOTE — PATIENT INSTRUCTIONS
Patient would like to go to [facility] via private vehicle, she declines ambulance transfer  She would like her daughter to drive her  Please go to the [facility] Emergency Department now for further evaluation and treatment- hospital address verified with the patient  Patient agreed to go immediately to the ED

## 2019-08-28 NOTE — ED PROVIDER NOTES
History  Chief Complaint   Patient presents with    Head Injury     pt reports she was out for a walk and tripped on the uneven sidewalk  -thinners -LOC +head strike  abrasions to L side of face     Patient is a 58-year-old female who presents after trauma  Patient said several hours ago she was walking and had a mechanical fall  She says she tripped over the sidewalk and fell striking the left side of her body  She had head trauma with no loss of consciousness  Patient suffered some abrasions over the left side of her face  She denies other neurologic symptoms including headache, altered mental status, nausea, vomiting, focal neurological deficit  Patient is on 81 mg aspirin but denies any other blood thinner use  She also notes abrasions over her left shoulder and left knee though she denies pain in this area  She denies any chest pain, dyspnea, abdominal pain  She has been ambulatory since the accident  She has not taken anything for the pain  Unknown last tetanus  Patient also notes laceration on the upper inner lip secondary to tooth bite  Prior to Admission Medications   Prescriptions Last Dose Informant Patient Reported? Taking?    LORazepam (ATIVAN) 1 mg tablet Past Month at Unknown time  No Yes   Sig: Take 1 tablet (1 mg total) by mouth once in imaging for anxiety for up to 1 dose   Multiple Vitamin (DAILY VALUE MULTIVITAMIN) TABS 8/27/2019 at Unknown time  Yes Yes   Sig: Take by mouth   calcium carbonate (OS-MAGDALENA) 1250 (500 Ca) MG chewable tablet Past Month at Unknown time  Yes Yes   Sig: Chew 1 tablet daily   fluticasone (FLONASE) 50 mcg/act nasal spray Past Month at Unknown time  Yes Yes   Sig: into each nostril   fluticasone (FLONASE) 50 mcg/act nasal spray Past Month at Unknown time  Yes Yes   Sig: SPRAY 2 SPRAYS INTO EACH NOSTRIL EVERY DAY   levothyroxine (SYNTHROID) 125 mcg tablet 8/27/2019 at Unknown time  Yes Yes   Sig: Take by mouth   metoprolol succinate (TOPROL-XL) 50 mg 24 hr tablet 8/27/2019 at Unknown time  No Yes   Sig: TAKE 1 TABLET DAILY      Facility-Administered Medications: None       Past Medical History:   Diagnosis Date    Disease of thyroid gland     Tachycardia        History reviewed  No pertinent surgical history  Family History   Problem Relation Age of Onset    Colon cancer Father 67    No Known Problems Mother      I have reviewed and agree with the history as documented  Social History     Tobacco Use    Smoking status: Never Smoker    Smokeless tobacco: Never Used   Substance Use Topics    Alcohol use: Yes     Comment: social    Drug use: Never        Review of Systems   Constitutional: Negative for chills, diaphoresis, fatigue and fever  HENT: Positive for facial swelling  Negative for sore throat and trouble swallowing  Respiratory: Negative for cough, chest tightness, shortness of breath and wheezing  Cardiovascular: Negative for chest pain  Gastrointestinal: Negative for abdominal distention, abdominal pain, diarrhea, nausea and vomiting  Genitourinary: Negative for dysuria  Musculoskeletal: Negative for back pain, neck pain and neck stiffness  Skin: Negative for color change, pallor, rash and wound  Neurological: Negative for weakness, light-headedness and numbness  Psychiatric/Behavioral: Negative for agitation  All other systems reviewed and are negative        Physical Exam  ED Triage Vitals   Temperature Pulse Respirations Blood Pressure SpO2   08/27/19 2119 08/27/19 2119 08/27/19 2119 08/27/19 2119 08/27/19 2119   98 1 °F (36 7 °C) 77 16 153/88 99 %      Temp src Heart Rate Source Patient Position - Orthostatic VS BP Location FiO2 (%)   -- 08/28/19 0110 08/28/19 0110 08/28/19 0110 --    Monitor Lying Left arm       Pain Score       08/27/19 2119       5             Orthostatic Vital Signs  Vitals:    08/27/19 2119 08/28/19 0110   BP: 153/88 148/78   Pulse: 77 70   Patient Position - Orthostatic VS:  Lying       Physical Exam   Constitutional: She is oriented to person, place, and time  She appears well-developed and well-nourished  No distress  HENT:   Head: Normocephalic  Eyes: Pupils are equal, round, and reactive to light  Neck: Normal range of motion  Neck supple  Full range of motion no tenderness   Cardiovascular: Normal rate, regular rhythm, normal heart sounds and intact distal pulses  Pulmonary/Chest: Effort normal and breath sounds normal    Lungs are clear bilaterally   Abdominal: Soft  Bowel sounds are normal  She exhibits no distension  There is no tenderness  There is no guarding  Abdomen is soft, nondistended, nontender  No rebound tenderness or guarding is noted  No masses palpated  Normal bowel sounds  Musculoskeletal: Normal range of motion  She exhibits no edema, tenderness or deformity  Full range of motion no tenderness of bilateral shoulders, elbows, wrists, ankles, knees, hips  Abrasions noted over left knee and left shoulder  Neurological: She is alert and oriented to person, place, and time  No cranial nerve deficit or sensory deficit  Alert oriented x3  Cranial nerves 2-12 intact  Strength 5/5 in all 4 extremities  Sensation intact throughout  Skin: Skin is warm and dry  Capillary refill takes less than 2 seconds  Psychiatric: She has a normal mood and affect  Her behavior is normal  Judgment and thought content normal    Vitals reviewed  ED Medications  Medications   tetanus-diphtheria-acellular pertussis (BOOSTRIX) IM injection 0 5 mL (0 5 mL Intramuscular Given 8/27/19 2248)       Diagnostic Studies  Results Reviewed     None                 CT head without contrast   Final Result by Arnulfo Ariza MD (08/28 0012)      No acute intracranial abnormality  Workstation performed: [id number]         CT facial bones without contrast   Final Result by Arnulfo Ariza MD (08/28 0016)      No acute maxillofacial fracture                 Workstation performed: [id number]               Procedures  Procedures        ED Course                               MDM  Number of Diagnoses or Management Options  Fall, initial encounter: new and does not require workup  Diagnosis management comments: Initial evaluation:  Patient is a 48-year-old female who presents after mechanical fall with facial abrasions, lip laceration  Patient will be worked up with CT head and CT facial bones  Patient given tetanus due to unknown history  She was then be re-evaluated  Final evaluation:  Patient is a 48-year-old female who presents with mechanical fall with obvious head trauma  CT head /CT facial bones were negative for any acute fracture or intracranial bleed  Patient also had small laceration at her upper lip her that we will allow to heal by   Itself  The remainder the exam is benign  Patient communicated understanding and she will follow up with her primary care provider as needed  Patient was provided with tetanus  She was advised to use Motrin and Tylenol for pain  Moving forward       Amount and/or Complexity of Data Reviewed  Clinical lab tests: ordered and reviewed  Tests in the radiology section of CPT®: ordered and reviewed    Risk of Complications, Morbidity, and/or Mortality  Presenting problems: low  Diagnostic procedures: low  Management options: low    Patient Progress  Patient progress: improved      Disposition  Final diagnoses:   Fall, initial encounter     Time reflects when diagnosis was documented in both MDM as applicable and the Disposition within this note     Time User Action Codes Description Comment    8/28/2019  1:04 AM Tiffanie Gonzales Add [Q24  Kisha Ahr, initial encounter       ED Disposition     ED Disposition Condition Date/Time Comment    Discharge Stable Wed Aug 28, 2019  1:04 AM Sahra Brown discharge to home/self care              Follow-up Information     Follow up With Specialties Details Why Contact Info Additional Information [address] [place] [address] Emergency Department Emergency Medicine  If symptoms worsen [address]  [phone]  ED, [address] [place] [address], [phone]          Discharge Medication List as of 8/28/2019  1:06 AM      CONTINUE these medications which have NOT CHANGED    Details   calcium carbonate (OS-MAGDALENA) 1250 (500 Ca) MG chewable tablet Chew 1 tablet daily, Historical Med      !! fluticasone (FLONASE) 50 mcg/act nasal spray into each nostril, Historical Med      !! fluticasone (FLONASE) 50 mcg/act nasal spray SPRAY 2 SPRAYS INTO EACH NOSTRIL EVERY DAY, Historical Med      levothyroxine (SYNTHROID) 125 mcg tablet Take by mouth, Historical Med      LORazepam (ATIVAN) 1 mg tablet Take 1 tablet (1 mg total) by mouth once in imaging for anxiety for up to 1 dose, Starting Tue 12/4/2018, Normal      metoprolol succinate (TOPROL-XL) 50 mg 24 hr tablet TAKE 1 TABLET DAILY, Normal      Multiple Vitamin (DAILY VALUE MULTIVITAMIN) TABS Take by mouth, Historical Med       !! - Potential duplicate medications found  Please discuss with provider  No discharge procedures on file  ED Provider  Attending physically available and evaluated [address]  I managed the patient along with the ED Attending      Electronically Signed by         Venessa Greenfield MD  08/28/19 0123

## 2019-09-10 NOTE — ED ATTENDING ATTESTATION
Newton Burgess MD, saw and evaluated the patient  I have discussed the patient with the resident/non-physician practitioner and agree with the resident's/non-physician practitioner's findings, Plan of Care, and MDM as documented in the resident's/non-physician practitioner's note, except where noted  All available labs and Radiology studies were reviewed  I was present for key portions of any procedure(s) performed by the resident/non-physician practitioner and I was immediately available to provide assistance  At this point I agree with the current assessment done in the Emergency Department  I have conducted an independent evaluation of this patient a history and physical is as follows:    Patient presents after a mechanical fall where she tripped on the sidewalk and struck the L side of her body  No LOC  Patient is on ASA  No nausea, vomiting, numbness/weakness  No additional complaints  Exam: AAOx3, NAD, RRR, CTA, S/NT/ND, no cervical TTP, abrasions over L knee, L shouler, and L face with inner lip laceration  A/P: Closed head injury, multiple abrasions  Will CT head and face  Will update tetanus       Critical Care Time  Procedures

## 2020-01-28 ENCOUNTER — OFFICE VISIT (OUTPATIENT)
Dept: CARDIOLOGY CLINIC | Facility: CLINIC | Age: 55
End: 2020-01-28
Payer: COMMERCIAL

## 2020-01-28 ENCOUNTER — CLINICAL SUPPORT (OUTPATIENT)
Dept: CARDIOLOGY CLINIC | Facility: CLINIC | Age: 55
End: 2020-01-28
Payer: COMMERCIAL

## 2020-01-28 VITALS
RESPIRATION RATE: 18 BRPM | BODY MASS INDEX: 25.99 KG/M2 | HEART RATE: 70 BPM | HEIGHT: 67 IN | DIASTOLIC BLOOD PRESSURE: 70 MMHG | WEIGHT: 165.6 LBS | OXYGEN SATURATION: 97 % | SYSTOLIC BLOOD PRESSURE: 130 MMHG

## 2020-01-28 DIAGNOSIS — E03.9 ACQUIRED HYPOTHYROIDISM: ICD-10-CM

## 2020-01-28 DIAGNOSIS — R00.0 TACHYCARDIA: ICD-10-CM

## 2020-01-28 DIAGNOSIS — R00.0 TACHYCARDIA: Primary | ICD-10-CM

## 2020-01-28 PROCEDURE — 99214 OFFICE O/P EST MOD 30 MIN: CPT | Performed by: INTERNAL MEDICINE

## 2020-01-28 PROCEDURE — 0296T PR EXT ECG > 48HR TO 21 DAY RCRD W/CONECT INTL RCRD: CPT | Performed by: INTERNAL MEDICINE

## 2020-01-28 PROCEDURE — 93000 ELECTROCARDIOGRAM COMPLETE: CPT | Performed by: INTERNAL MEDICINE

## 2020-01-28 RX ORDER — LEVOTHYROXINE SODIUM 0.1 MG/1
112 TABLET ORAL
COMMUNITY
Start: 2020-01-02

## 2020-01-28 NOTE — ASSESSMENT & PLAN NOTE
Patient has longstanding history of hypothyroidism that has been stable  More recently however her TSH was somewhat elevated and levothyroxine was increased to 137 mcg daily  Follow-up TSH is planned

## 2020-01-28 NOTE — ASSESSMENT & PLAN NOTE
Patient history presumably sinus tachycardia that has responded to the use metoprolol  At this time the patient is experiencing some sensation chest periods of racing sensation this could last for  This may represent episodes of supraventricular tachycardia  I will be arrange for the patient to a Holter monitor as as regular exercise stress test and  I am advising the patient to continue to use extra metoprolol on a p r n  Basis  It is not clear if anxiety is playing a role at this point

## 2020-01-28 NOTE — PATIENT INSTRUCTIONS
Patient is advised to continue on the same medications  She can take extra metoprolol as needed for symptoms of palpitation and racing heart rate  I will arrange for the patient to have Holter monitor, regular exercise stress test and echocardiogram   The patient is already scheduled for follow-up TSH

## 2020-01-28 NOTE — PROGRESS NOTES
Assessment/Plan:    Tachycardia  Patient history presumably sinus tachycardia that has responded to the use metoprolol  At this time the patient is experiencing some sensation chest periods of racing sensation this could last for  This may represent episodes of supraventricular tachycardia  I will be arrange for the patient to a Holter monitor as as regular exercise stress test and  I am advising the patient to continue to use extra metoprolol on a p r n  Basis  It is not clear if anxiety is playing a role at this point  Acquired hypothyroidism  Patient has longstanding history of hypothyroidism that has been stable  More recently however her TSH was somewhat elevated and levothyroxine was increased to 137 mcg daily  Follow-up TSH is planned  Diagnoses and all orders for this visit:    Tachycardia  -     POCT ECG  -     Stress test only, exercise; Future  -     Echo complete with contrast if indicated; Future  -     AMB extended holter monitor; Future    Acquired hypothyroidism    Other orders  -     levothyroxine 137 mcg tablet          Subjective:  Palpitation     Patient ID: Shirley Abbott is a 53 y o  female  The patient presented to this office for the purpose of cardiac follow-up  She has been experiencing increased symptoms of palpitation followed at times by raising sensation in the chest it could last for few minutes but can also last for about 3 hours she has tried an extra metoprolol with some improvement  She has momentary lightheadedness when symptoms started  Otherwise no symptoms of syncope or near-syncope  She denies any chest pain or shortness of breath  She denies any leg  They may have been in increased level of stress in her life lately        The following portions of the patient's history were reviewed and updated as appropriate: allergies, current medications, past family history, past medical history, past social history, past surgical history and problem list     Review of Systems   Respiratory: Negative for apnea, cough, chest tightness, shortness of breath and wheezing  Cardiovascular: Positive for palpitations  Negative for chest pain and leg swelling  Gastrointestinal: Negative for abdominal pain  Neurological: Positive for light-headedness  Negative for dizziness  Hematological: Negative  Psychiatric/Behavioral: Negative  Objective:  Stable cardiac-wise  /70 (BP Location: Right arm, Patient Position: Sitting)   Pulse 70   Resp 18   Ht 5' 7" (1 702 m)   Wt 75 1 kg (165 lb 9 6 oz)   LMP 07/06/2018 (Exact Date)   SpO2 97%   BMI 25 94 kg/m²          Physical Exam   Constitutional: She is oriented to person, place, and time  She appears well-developed and well-nourished  No distress  HENT:   Head: Normocephalic and atraumatic  Neck: Normal range of motion  Neck supple  No JVD present  No thyromegaly present  Cardiovascular: Normal rate, regular rhythm, S1 normal and S2 normal  Exam reveals no gallop and no friction rub  No murmur heard  Pulmonary/Chest: Effort normal  No respiratory distress  She has no wheezes  She has no rales  She exhibits no tenderness  Abdominal: Soft  Musculoskeletal: She exhibits no edema  Neurological: She is alert and oriented to person, place, and time  Skin: Skin is warm and dry  She is not diaphoretic  Psychiatric: She has a normal mood and affect  Vitals reviewed

## 2020-01-28 NOTE — LETTER
January 28, 2020     Referral [address] [place] [id number]    Patient: Romulo Hull   YOB: 1965   Date of Visit: 1/28/2020       Dear Dr Ruel Rogers:    Thank you for referring Manuel Garland to me for evaluation  Below are my notes for this consultation  If you have questions, please do not hesitate to call me  I look forward to following your patient along with you  Sincerely,        Erin Higuera MD        CC: DO Erin iMranda MD  1/28/2020 12:19 PM  Sign at close encounter  Assessment/Plan:    Tachycardia  Patient history presumably sinus tachycardia that has responded to the use metoprolol  At this time the patient is experiencing some sensation chest periods of racing sensation this could last for  This may represent episodes of supraventricular tachycardia  I will be arrange for the patient to a Holter monitor as as regular exercise stress test and  I am advising the patient to continue to use extra metoprolol on a p r n  Basis  It is not clear if anxiety is playing a role at this point  Acquired hypothyroidism  Patient has longstanding history of hypothyroidism that has been stable  More recently however her TSH was somewhat elevated and levothyroxine was increased to 137 mcg daily  Follow-up TSH is planned  Diagnoses and all orders for this visit:    Tachycardia  -     POCT ECG  -     Stress test only, exercise; Future  -     Echo complete with contrast if indicated; Future  -     AMB extended holter monitor; Future    Acquired hypothyroidism    Other orders  -     levothyroxine 137 mcg tablet          Subjective:  Palpitation     Patient ID: Romulo Hull is a 53 y o  female  The patient presented to this office for the purpose of cardiac follow-up    She has been experiencing increased symptoms of palpitation followed at times by raising sensation in the chest it could last for few minutes but can also last for about 3 hours she has tried an extra metoprolol with some improvement  She has momentary lightheadedness when symptoms started  Otherwise no symptoms of syncope or near-syncope  She denies any chest pain or shortness of breath  She denies any leg  They may have been in increased level of stress in her life lately  The following portions of the patient's history were reviewed and updated as appropriate: allergies, current medications, past family history, past medical history, past social history, past surgical history and problem list     Review of Systems   Respiratory: Negative for apnea, cough, chest tightness, shortness of breath and wheezing  Cardiovascular: Positive for palpitations  Negative for chest pain and leg swelling  Gastrointestinal: Negative for abdominal pain  Neurological: Positive for light-headedness  Negative for dizziness  Hematological: Negative  Psychiatric/Behavioral: Negative  Objective:  Stable cardiac-wise  /70 (BP Location: Right arm, Patient Position: Sitting)   Pulse 70   Resp 18   Ht 5' 7" (1 702 m)   Wt 75 1 kg (165 lb 9 6 oz)   LMP 07/06/2018 (Exact Date)   SpO2 97%   BMI 25 94 kg/m²           Physical Exam   Constitutional: She is oriented to person, place, and time  She appears well-developed and well-nourished  No distress  HENT:   Head: Normocephalic and atraumatic  Neck: Normal range of motion  Neck supple  No JVD present  No thyromegaly present  Cardiovascular: Normal rate, regular rhythm, S1 normal and S2 normal  Exam reveals no gallop and no friction rub  No murmur heard  Pulmonary/Chest: Effort normal  No respiratory distress  She has no wheezes  She has no rales  She exhibits no tenderness  Abdominal: Soft  Musculoskeletal: She exhibits no edema  Neurological: She is alert and oriented to person, place, and time  Skin: Skin is warm and dry  She is not diaphoretic  Psychiatric: She has a normal mood and affect  Vitals reviewed

## 2020-02-04 ENCOUNTER — HOSPITAL ENCOUNTER (OUTPATIENT)
Dept: NON INVASIVE DIAGNOSTICS | Facility: CLINIC | Age: 55
Discharge: HOME | End: 2020-02-04
Payer: COMMERCIAL

## 2020-02-04 ENCOUNTER — HOSPITAL ENCOUNTER (OUTPATIENT)
Dept: NON INVASIVE DIAGNOSTICS | Facility: CLINIC | Age: 55
Discharge: HOME | End: 2020-02-04
Attending: INTERNAL MEDICINE
Payer: COMMERCIAL

## 2020-02-04 DIAGNOSIS — R00.0 TACHYCARDIA: ICD-10-CM

## 2020-02-04 PROCEDURE — 93016 CV STRESS TEST SUPVJ ONLY: CPT | Performed by: INTERNAL MEDICINE

## 2020-02-04 PROCEDURE — 93306 TTE W/DOPPLER COMPLETE: CPT | Performed by: INTERNAL MEDICINE

## 2020-02-04 PROCEDURE — 93018 CV STRESS TEST I&R ONLY: CPT | Performed by: INTERNAL MEDICINE

## 2020-02-04 PROCEDURE — 93306 TTE W/DOPPLER COMPLETE: CPT

## 2020-02-04 PROCEDURE — 93017 CV STRESS TEST TRACING ONLY: CPT

## 2020-02-04 RX ORDER — METOPROLOL TARTRATE 5 MG/5ML
5 INJECTION INTRAVENOUS ONCE
Status: COMPLETED | OUTPATIENT
Start: 2020-02-04 | End: 2020-02-04

## 2020-02-04 RX ORDER — ADENOSINE 3 MG/ML
6 INJECTION, SOLUTION INTRAVENOUS ONCE
Status: COMPLETED | OUTPATIENT
Start: 2020-02-04 | End: 2020-02-04

## 2020-02-04 RX ADMIN — ADENOSINE 6 MG: 3 SOLUTION INTRAVENOUS at 10:58

## 2020-02-04 RX ADMIN — METOPROLOL TARTRATE 5 MG: 5 INJECTION, SOLUTION INTRAVENOUS at 10:46

## 2020-02-04 NOTE — PROGRESS NOTES
Patient had an exercise stress test today,  She went into SVT up to 220s  Immediately into recovery  No response to vagal maneuvers Or 5 mg IV metoprolol  Adenosine 6 mg IV was given once resulting in conversion to sinus rhythm  Her blood pressures remained stable throughout   Advised to increase her metoprolol to 50 XL b i d  Informed her cardiologist Dr Pedro Schaefer of the events

## 2020-02-05 ENCOUNTER — TELEPHONE (OUTPATIENT)
Dept: CARDIOLOGY CLINIC | Facility: CLINIC | Age: 55
End: 2020-02-05

## 2020-02-05 DIAGNOSIS — I47.1 SVT (SUPRAVENTRICULAR TACHYCARDIA) (HCC): Primary | ICD-10-CM

## 2020-02-05 NOTE — TELEPHONE ENCOUNTER
Pt was told yesterday when she had her testing that if she has a racing heart rate she can take an extra dose of metoprolol- Pt would like to know if she can do this whenever this happens?  Thank you

## 2020-02-07 LAB
CHEST PAIN STATEMENT: NORMAL
MAX DIASTOLIC BP: 54 MMHG
MAX HEART RATE: 222 BPM
MAX PREDICTED HEART RATE: 165 BPM
MAX. SYSTOLIC BP: 150 MMHG
PROTOCOL NAME: NORMAL
REASON FOR TERMINATION: NORMAL
TARGET HR FORMULA: NORMAL
TEST INDICATION: NORMAL
TIME IN EXERCISE PHASE: NORMAL

## 2020-02-19 ENCOUNTER — ANNUAL EXAM (OUTPATIENT)
Dept: OBGYN CLINIC | Facility: CLINIC | Age: 55
End: 2020-02-19
Payer: COMMERCIAL

## 2020-02-19 VITALS
HEIGHT: 67 IN | SYSTOLIC BLOOD PRESSURE: 120 MMHG | BODY MASS INDEX: 26.34 KG/M2 | WEIGHT: 167.8 LBS | DIASTOLIC BLOOD PRESSURE: 84 MMHG

## 2020-02-19 DIAGNOSIS — Z12.39 BREAST CANCER SCREENING: ICD-10-CM

## 2020-02-19 DIAGNOSIS — Z01.419 WOMEN'S ANNUAL ROUTINE GYNECOLOGICAL EXAMINATION: Primary | ICD-10-CM

## 2020-02-19 PROCEDURE — G0145 SCR C/V CYTO,THINLAYER,RESCR: HCPCS | Performed by: OBSTETRICS & GYNECOLOGY

## 2020-02-19 PROCEDURE — 87624 HPV HI-RISK TYP POOLED RSLT: CPT | Performed by: OBSTETRICS & GYNECOLOGY

## 2020-02-19 PROCEDURE — S0612 ANNUAL GYNECOLOGICAL EXAMINA: HCPCS | Performed by: OBSTETRICS & GYNECOLOGY

## 2020-02-19 NOTE — PROGRESS NOTES
Assessment/Plan:    The patient was informed of a stable menopausal gyn examination  There was evidence of a slight prolapse of the uterus  This is not interfering with her lifestyle or her sexual activity  Will watch and observe  A Pap smear was performed  She will watch her weight gain  She will consider having the ablation with the cardiologist for her SVT  She was reminded that she needs to make arrange for colonoscopy  There is positive colon cancer in her family  She has a dentist on a regular basis  She denies any problem depression or anxiety  Return my office in 1 year  Subjective:      Patient ID: Sergei Clement is a 53 y o  female  HPI      This is a 62-year-old white female, she is a  2 para 2 with 2 prior vaginal deliveries  Her current method of contraception includes vasectomy  She is now in menopause  Menopause symptoms are under control  Does admit some slight discomfort with intercourse  We had a discussion about using lubricant  Since her last visit his she has been diagnosed with supraventricular tachycardia  She has been evaluated she may need to have an ablation soon  She is also has hypothyroidism  She is not happy with her weight was going up slowly  Denies any problem depression or anxiety  She has a dentist on a regular basis  There are no new major family illnesses or family death to report at this time  The following portions of the patient's history were reviewed and updated as appropriate: allergies, current medications, past family history, past medical history, past social history, past surgical history and problem list     Review of Systems   All other systems reviewed and are negative  Objective:      /84   Ht 5' 7" (1 702 m)   Wt 76 1 kg (167 lb 12 8 oz)   LMP 2018 (Exact Date)   BMI 26 28 kg/m²          Physical Exam   Constitutional: She appears well-developed and well-nourished  HENT:   Head: Normocephalic  Eyes: EOM are normal    Neck: Normal range of motion  Cardiovascular: Normal rate, regular rhythm and normal heart sounds  Exam reveals no friction rub  No murmur heard  Pulmonary/Chest: Effort normal and breath sounds normal  No stridor  No respiratory distress  She has no wheezes  She has no rales  Right breast exhibits no inverted nipple, no mass, no nipple discharge, no skin change and no tenderness  Left breast exhibits no inverted nipple, no mass, no nipple discharge, no skin change and no tenderness  No breast swelling, tenderness, discharge or bleeding  Breasts are symmetrical    Abdominal: Soft  Bowel sounds are normal  She exhibits no distension and no mass  There is no tenderness  There is no rebound and no guarding  Hernia confirmed negative in the right inguinal area and confirmed negative in the left inguinal area  Genitourinary: Rectum normal and vagina normal  No labial fusion  There is no rash, tenderness, lesion or injury on the right labia  There is no rash, tenderness, lesion or injury on the left labia  Uterus is not deviated, not enlarged, not fixed and not tender  Cervix exhibits no motion tenderness, no discharge and no friability  Right adnexum displays no mass, no tenderness and no fullness  Left adnexum displays no mass, no tenderness and no fullness  No erythema, tenderness or bleeding in the vagina  No foreign body in the vagina  No signs of injury around the vagina  No vaginal discharge found  Genitourinary Comments: The external genitalia normal limits, the vagina is clean  Cervix is parous  Uterus is midposition normal size  There is some slight prolapse of the cervix down to the vagina  It does not cause prolapse to the introitus  The patient denies any problem with bladder control intimacy  It is not interfering with her lifestyle  This is probably a grade 1 uterine prolapse   Musculoskeletal: Normal range of motion     Lymphadenopathy: No inguinal adenopathy noted on the right or left side  Neurological: She is alert  Skin: Skin is warm and dry  Psychiatric: She has a normal mood and affect   Her behavior is normal

## 2020-02-19 NOTE — PATIENT INSTRUCTIONS
The patient was informed of a stable menopausal gyn examination  A Pap smear was performed  She will try to exercise lose more weight  She needs to make arrangements for colonoscopy  She does have a positive family history of colon cancer  She will follow with her cardiologist as regard to evaluation for SVT in the possibility of a cardiac ablation  Return my office in 1 year

## 2020-02-20 ENCOUNTER — TELEPHONE (OUTPATIENT)
Dept: OTHER | Facility: OTHER | Age: 55
End: 2020-02-20

## 2020-02-21 ENCOUNTER — HOSPITAL ENCOUNTER (OUTPATIENT)
Dept: RADIOLOGY | Age: 55
Discharge: HOME | End: 2020-02-21
Payer: COMMERCIAL

## 2020-02-21 VITALS — HEIGHT: 67 IN | WEIGHT: 167 LBS | BODY MASS INDEX: 26.21 KG/M2

## 2020-02-21 DIAGNOSIS — Z12.39 BREAST CANCER SCREENING: ICD-10-CM

## 2020-02-21 LAB
HPV HR 12 DNA CVX QL NAA+PROBE: NEGATIVE
HPV16 DNA CVX QL NAA+PROBE: NEGATIVE
HPV18 DNA CVX QL NAA+PROBE: NEGATIVE

## 2020-02-21 PROCEDURE — 77063 BREAST TOMOSYNTHESIS BI: CPT

## 2020-02-21 PROCEDURE — 77067 SCR MAMMO BI INCL CAD: CPT

## 2020-02-24 LAB
LAB AP GYN PRIMARY INTERPRETATION: NORMAL
Lab: NORMAL

## 2020-02-25 ENCOUNTER — CLINICAL SUPPORT (OUTPATIENT)
Dept: CARDIOLOGY CLINIC | Facility: CLINIC | Age: 55
End: 2020-02-25
Payer: COMMERCIAL

## 2020-02-25 DIAGNOSIS — R00.0 TACHYCARDIA: ICD-10-CM

## 2020-02-25 PROCEDURE — 93228 REMOTE 30 DAY ECG REV/REPORT: CPT | Performed by: INTERNAL MEDICINE

## 2020-03-11 ENCOUNTER — TELEPHONE (OUTPATIENT)
Dept: CARDIOLOGY CLINIC | Facility: CLINIC | Age: 55
End: 2020-03-11

## 2020-03-11 ENCOUNTER — CONSULT (OUTPATIENT)
Dept: CARDIOLOGY CLINIC | Facility: CLINIC | Age: 55
End: 2020-03-11
Payer: COMMERCIAL

## 2020-03-11 VITALS
HEART RATE: 184 BPM | BODY MASS INDEX: 25.74 KG/M2 | WEIGHT: 164 LBS | HEIGHT: 67 IN | SYSTOLIC BLOOD PRESSURE: 98 MMHG | DIASTOLIC BLOOD PRESSURE: 70 MMHG

## 2020-03-11 DIAGNOSIS — I47.1 SVT (SUPRAVENTRICULAR TACHYCARDIA) (HCC): ICD-10-CM

## 2020-03-11 DIAGNOSIS — R00.0 TACHYCARDIA: Primary | ICD-10-CM

## 2020-03-11 PROBLEM — I47.10 SVT (SUPRAVENTRICULAR TACHYCARDIA): Status: ACTIVE | Noted: 2020-03-11

## 2020-03-11 PROCEDURE — 99244 OFF/OP CNSLTJ NEW/EST MOD 40: CPT | Performed by: INTERNAL MEDICINE

## 2020-03-11 PROCEDURE — 93000 ELECTROCARDIOGRAM COMPLETE: CPT | Performed by: INTERNAL MEDICINE

## 2020-03-11 NOTE — TELEPHONE ENCOUNTER
Pt is scheduled on 04/09/20 for a SVT [facility] with Dr Terrial Opitz  Pt aware of instructions  Can I get preauth please

## 2020-03-11 NOTE — PROGRESS NOTES
EPS Consultation/New Patient Evaluation - Sabra Corado 53 y o  female MRN: [id number]           ASSESSMENT:  1  Tachycardia  POCT ECG   2  SVT (supraventricular tachycardia) ([address] )  Ambulatory referral to Cardiac Electrophysiology    POCT ECG           PLAN:  We discussed in detail treatment options including SVT ablation or up titration of her metoprolol  She is already on 50 mg daily with a blood pressure of 98/70  She is very symptomatic with her SVT and has episodes more than once a week lasting up to 4 hours  She is in SVT in the office today at 184 per minute  I showed her  and her how ablation is performed including showing images of an actual ablation I did explain the risks include but are not limited to bleeding bruising damage to blood vessels nerves  I also explained there is inherent risk of stroke heart perforation need for pacemaker implant or myocardial infarction with any cardiac procedure and explained all of these complications are rare  I estimate approximately 90-95% long-term success rate with one procedure  The patient and  are agreeable to move forward with catheter ablation  She will take two baby aspirin the night before the procedure and hold her metoprolol the day before the procedure  All questions were answered  REVIEW OF HER 12 LEAD DURING HER STRESS TEST AFTER THE  APPOINTMENTS SUGGEST VENTRICULAR PRE-EXCITATION LIKELY A LEFT LATERAL PATHWAY  CC/HPI:     Presents in consultation for SVT  She remembers palpitations when she was younger  She had more frequent episodes in her 25s particularly with her pregnancy  She has been on Toprol XL for many years and the SVT was controlled for almost 20 years on Toprol-XL but now even on 50 mg daily she is having episodes that occur sometimes as often as daily to weekly  In the office today she is in SVT at 184 beats per minute    It is a short RP tachycardia with a P-wave slightly if off the QRS suggesting the presence of a bypass tract mediated SVT  ROS:   Positive for palpitations lightheadedness dizziness and fatigue during SVT also the higher dose of metoprolol does make her tired       Objective:     Vitals: Blood pressure 98/70, pulse (!) 184, height 5' 7" (1 702 m), weight 74 4 kg (164 lb), last menstrual period 07/06/2018, not currently breastfeeding , Body mass index is 25 69 kg/m²  ,        Physical Exam:    GEN: Nando Victoria appears well, alert and oriented x 3, pleasant and cooperative   HEENT: pupils equal, round, and reactive to light; extraocular muscles intact  NECK: supple, no carotid bruits   HEART: regular rhythm, normal S1 and S2, no murmurs, clicks, gallops or rubs   LUNGS: clear to auscultation bilaterally; no wheezes, rales, or rhonchi   ABDOMEN: normal bowel sounds, soft, no tenderness, no distention  EXTREMITIES: peripheral pulses normal; no clubbing, cyanosis, or edema  NEURO: no focal findings   SKIN: normal without suspicious lesions on exposed skin    Medications:      Current Outpatient Medications:     fluticasone (FLONASE) 50 mcg/act nasal spray, into each nostril, Disp: , Rfl:     levothyroxine 137 mcg tablet, , Disp: , Rfl:     metoprolol succinate (TOPROL-XL) 50 mg 24 hr tablet, TAKE 1 TABLET DAILY, Disp: 90 tablet, Rfl: 3    calcium carbonate (OS-MAGDALENA) 1250 (500 Ca) MG chewable tablet, Chew 1 tablet daily, Disp: , Rfl:     Multiple Vitamin (DAILY VALUE MULTIVITAMIN) TABS, Take by mouth, Disp: , Rfl:      Family History   Problem Relation Age of Onset    Colon cancer Father 67    No Known Problems Mother     No Known Problems Sister     No Known Problems Daughter     No Known Problems Daughter     No Known Problems Maternal Aunt     No Known Problems Paternal Aunt      Social History     Socioeconomic History    Marital status: /Civil Union     Spouse name: Not on file    Number of children: Not on file    Years of education: Not on file  Highest education level: Not on file   Occupational History    Not on file   Social Needs    Financial resource strain: Not on file    Food insecurity:     Worry: Not on file     Inability: Not on file    Transportation needs:     Medical: Not on file     Non-medical: Not on file   Tobacco Use    Smoking status: Never Smoker    Smokeless tobacco: Never Used   Substance and Sexual Activity    Alcohol use: Yes     Comment: social    Drug use: Never    Sexual activity: Yes     Partners: Male     Birth control/protection: Post-menopausal   Lifestyle    Physical activity:     Days per week: Not on file     Minutes per session: Not on file    Stress: Not on file   Relationships    Social connections:     Talks on phone: Not on file     Gets together: Not on file     Attends [employer] service: Not on file     Active member of club or organization: Not on file     Attends meetings of clubs or organizations: Not on file     Relationship status: Not on file    Intimate partner violence:     Fear of current or ex partner: Not on file     Emotionally abused: Not on file     Physically abused: Not on file     Forced sexual activity: Not on file   Other Topics Concern    Not on file   Social History Narrative    Not on file     Social History     Tobacco Use   Smoking Status Never Smoker   Smokeless Tobacco Never Used     Social History     Substance and Sexual Activity   Alcohol Use Yes    Comment: social       Labs & Results:  Below is the patient's most recent value for Albumin, ALT, AST, BUN, Calcium, Chloride, Cholesterol, CO2, Creatinine, GFR, Glucose, HDL, Hematocrit, Hemoglobin, Hemoglobin A1C, LDL, Magnesium, Phosphorus, Platelets, Potassium, PSA, Sodium, Triglycerides, and WBC     No results found for: ALT, AST, BUN, CALCIUM, CL, CHOL, CO2, CREATININE, GFRAA, GFRNONAA, HDL, HCT, HGB, HGBA1C, LDL, MG, PHOS, PLT, K, PSA, NA, TRIG, WBC  Note: for a comprehensive list of the patient's lab results, access the Results Review activity  Cardiac testing:   Results for orders placed during the hospital encounter of 20   Echo complete with contrast if indicated    Narrative [address]  [facility], [address] [facility]  [phone]    Transthoracic Echocardiogram  2D, M-mode, Doppler, and Color Doppler    Study date:  2020    Patient: Larissa Teixeira  MR number: [id number]  Account number: [de-identified]  : 1965  Age: 53 years  Gender: Female  Status: Outpatient  Location: [address] [place] [address] [facility] [place]  Height: 67 in  Weight: 165 lb  BP: 130/ 70 mmHg    Indications: Tachycardia    Diagnoses: R00 0 - Tachycardia, unspecified    Sonographer:  LIZETTE Leong  Primary Physician:  Chinyere Spence DO  Referring Physician:  Erin Higuera MD  Group:  [id number]  Interpreting Physician:  Jerome Pinon DO    SUMMARY    LEFT VENTRICLE:  Systolic function was normal  Ejection fraction was estimated to be 60 %  There were no regional wall motion abnormalities  TRICUSPID VALVE:  There was mild regurgitation  HISTORY: PRIOR HISTORY: Tachycardia, hypothyroidism    PROCEDURE: The study was performed in the [address]  This was a routine study  The transthoracic approach was used  The study included complete 2D imaging, M-mode, complete spectral Doppler, and color Doppler  Image  quality was adequate  LEFT VENTRICLE: Size was normal  Systolic function was normal  Ejection fraction was estimated to be 60 %  There were no regional wall motion abnormalities  Wall thickness was normal  DOPPLER: Left ventricular diastolic function parameters  were normal     RIGHT VENTRICLE: The size was normal  Systolic function was normal  Wall thickness was normal     LEFT ATRIUM: Size was normal     RIGHT ATRIUM: Size was normal     MITRAL VALVE: Valve structure was normal  There was normal leaflet separation   DOPPLER: The transmitral velocity was within the normal range  There was no evidence for stenosis  There was no regurgitation  AORTIC VALVE: The valve was trileaflet  Leaflets exhibited normal thickness and normal cuspal separation  DOPPLER: Transaortic velocity was within the normal range  There was no evidence for stenosis  There was no regurgitation  TRICUSPID VALVE: The valve structure was normal  There was normal leaflet separation  DOPPLER: The transtricuspid velocity was within the normal range  There was no evidence for stenosis  There was mild regurgitation  PULMONIC VALVE: Leaflets exhibited normal thickness, no calcification, and normal cuspal separation  DOPPLER: The transpulmonic velocity was within the normal range  There was no regurgitation  PERICARDIUM: There was no pericardial effusion  The pericardium was normal in appearance  AORTA: The root exhibited normal size  SYSTEMIC VEINS: IVC: The inferior vena cava was normal in size and course  Respirophasic changes were normal     SYSTEM MEASUREMENT TABLES    2D  %FS: 30 21 %  Ao Diam: 2 47 cm  EDV(Teich): 83 75 ml  EF(Cube): 66 01 %  EF(Teich): 57 82 %  ESV(Cube): 27 31 ml  ESV(Teich): 35 32 ml  IVSd: 0 77 cm  LA Area: 12 04 cm2  LA Diam: 3 07 cm  LVEDV MOD A4C: 106 07 ml  LVEF MOD A4C: 63 45 %  LVESV MOD A4C: 38 77 ml  LVIDd: 4 31 cm  LVIDs: 3 01 cm  LVLd A4C: 7 86 cm  LVLs A4C: 5 93 cm  LVPWd: 0 72 cm  RA Area: 13 91 cm2  RV Diam : 3 66 cm  SV MOD A4C: 67 3 ml  SV(Cube): 53 03 ml  SV(Teich): 48 43 ml    CW  TR Vmax: 2 25 m/s  TR maxP 26 mmHg    MM  TAPSE: 2 51 cm    PW  E': 0 11 m/s  E/E': 8 62  MV A Hema: 0 78 m/s  MV Dec [place]: 4 02 m/s2  MV DecT: 236 62 ms  MV E Hema: 0 95 m/s  MV E/A Ratio: 1 21    [employer] Accredited Echocardiography Laboratory    Prepared and electronically signed by    Fozia Frank DO  Signed 2020 15:23:14       No results found for this or any previous visit    No results found for this or any previous visit  No results found for this or any previous visit

## 2020-03-25 NOTE — TELEPHONE ENCOUNTER
She does not need auth for her [place] [id number] [place] [facility] [place] [facility] 3/25/20

## 2020-04-08 ENCOUNTER — TELEPHONE (OUTPATIENT)
Dept: INPATIENT UNIT | Facility: HOSPITAL | Age: 55
End: 2020-04-08

## 2020-04-09 ENCOUNTER — ANESTHESIA (OUTPATIENT)
Dept: NON INVASIVE DIAGNOSTICS | Facility: HOSPITAL | Age: 55
End: 2020-04-09
Payer: COMMERCIAL

## 2020-04-09 ENCOUNTER — ANESTHESIA EVENT (OUTPATIENT)
Dept: NON INVASIVE DIAGNOSTICS | Facility: HOSPITAL | Age: 55
End: 2020-04-09
Payer: COMMERCIAL

## 2020-04-09 ENCOUNTER — HOSPITAL ENCOUNTER (OUTPATIENT)
Dept: NON INVASIVE DIAGNOSTICS | Facility: HOSPITAL | Age: 55
Discharge: HOME | End: 2020-04-09
Attending: INTERNAL MEDICINE | Admitting: INTERNAL MEDICINE
Payer: COMMERCIAL

## 2020-04-09 VITALS
DIASTOLIC BLOOD PRESSURE: 53 MMHG | TEMPERATURE: 98 F | SYSTOLIC BLOOD PRESSURE: 99 MMHG | RESPIRATION RATE: 18 BRPM | OXYGEN SATURATION: 98 % | HEART RATE: 89 BPM

## 2020-04-09 DIAGNOSIS — R00.0 TACHYCARDIA: ICD-10-CM

## 2020-04-09 DIAGNOSIS — I47.1 SVT (SUPRAVENTRICULAR TACHYCARDIA) (HCC): Primary | ICD-10-CM

## 2020-04-09 LAB
ANION GAP SERPL CALCULATED.3IONS-SCNC: 4 MMOL/L (ref 4–13)
ATRIAL RATE: 0 BPM
ATRIAL RATE: 73 BPM
BASOPHILS # BLD AUTO: 0.03 THOUSANDS/ΜL (ref 0–0.1)
BASOPHILS NFR BLD AUTO: 1 % (ref 0–1)
BUN SERPL-MCNC: 14 MG/DL (ref 5–25)
CALCIUM SERPL-MCNC: 9.3 MG/DL (ref 8.3–10.1)
CHLORIDE SERPL-SCNC: 107 MMOL/L (ref 100–108)
CO2 SERPL-SCNC: 27 MMOL/L (ref 21–32)
CREAT SERPL-MCNC: 0.83 MG/DL (ref 0.6–1.3)
EOSINOPHIL # BLD AUTO: 0.1 THOUSAND/ΜL (ref 0–0.61)
EOSINOPHIL NFR BLD AUTO: 3 % (ref 0–6)
ERYTHROCYTE [DISTWIDTH] IN BLOOD BY AUTOMATED COUNT: 11.9 % (ref 11.6–15.1)
GFR SERPL CREATININE-BSD FRML MDRD: 79 ML/MIN/1.73SQ M
GLUCOSE P FAST SERPL-MCNC: 95 MG/DL (ref 65–99)
GLUCOSE SERPL-MCNC: 95 MG/DL (ref 65–140)
HCT VFR BLD AUTO: 44.9 % (ref 34.8–46.1)
HGB BLD-MCNC: 15.1 G/DL (ref 11.5–15.4)
IMM GRANULOCYTES # BLD AUTO: 0.01 THOUSAND/UL (ref 0–0.2)
IMM GRANULOCYTES NFR BLD AUTO: 0 % (ref 0–2)
INR PPP: 1.04 (ref 0.84–1.19)
KCT BLD-ACNC: 310 SEC (ref 89–137)
KCT BLD-ACNC: 361 SEC (ref 89–137)
KCT BLD-ACNC: 367 SEC (ref 89–137)
KCT BLD-ACNC: 412 SEC (ref 89–137)
LYMPHOCYTES # BLD AUTO: 1.45 THOUSANDS/ΜL (ref 0.6–4.47)
LYMPHOCYTES NFR BLD AUTO: 37 % (ref 14–44)
MCH RBC QN AUTO: 29.5 PG (ref 26.8–34.3)
MCHC RBC AUTO-ENTMCNC: 33.6 G/DL (ref 31.4–37.4)
MCV RBC AUTO: 88 FL (ref 82–98)
MONOCYTES # BLD AUTO: 0.42 THOUSAND/ΜL (ref 0.17–1.22)
MONOCYTES NFR BLD AUTO: 11 % (ref 4–12)
NEUTROPHILS # BLD AUTO: 1.91 THOUSANDS/ΜL (ref 1.85–7.62)
NEUTS SEG NFR BLD AUTO: 48 % (ref 43–75)
NRBC BLD AUTO-RTO: 0 /100 WBCS
P AXIS: 75 DEGREES
PLATELET # BLD AUTO: 261 THOUSANDS/UL (ref 149–390)
PMV BLD AUTO: 10 FL (ref 8.9–12.7)
POTASSIUM SERPL-SCNC: 3.6 MMOL/L (ref 3.5–5.3)
PR INTERVAL: 122 MS
PROTHROMBIN TIME: 13.2 SECONDS (ref 11.6–14.5)
QRS AXIS: 0 DEGREES
QRS AXIS: 73 DEGREES
QRSD INTERVAL: 0 MS
QRSD INTERVAL: 110 MS
QT INTERVAL: 0 MS
QT INTERVAL: 432 MS
QTC INTERVAL: 0 MS
QTC INTERVAL: 475 MS
RBC # BLD AUTO: 5.11 MILLION/UL (ref 3.81–5.12)
SODIUM SERPL-SCNC: 138 MMOL/L (ref 136–145)
SPECIMEN SOURCE: ABNORMAL
T WAVE AXIS: 0 DEGREES
T WAVE AXIS: 57 DEGREES
VENTRICULAR RATE: 0 BPM
VENTRICULAR RATE: 73 BPM
WBC # BLD AUTO: 3.92 THOUSAND/UL (ref 4.31–10.16)

## 2020-04-09 PROCEDURE — C1893 INTRO/SHEATH, FIXED,NON-PEEL: HCPCS | Performed by: INTERNAL MEDICINE

## 2020-04-09 PROCEDURE — 93010 ELECTROCARDIOGRAM REPORT: CPT | Performed by: INTERNAL MEDICINE

## 2020-04-09 PROCEDURE — C1894 INTRO/SHEATH, NON-LASER: HCPCS | Performed by: INTERNAL MEDICINE

## 2020-04-09 PROCEDURE — NC001 PR NO CHARGE: Performed by: INTERNAL MEDICINE

## 2020-04-09 PROCEDURE — C1732 CATH, EP, DIAG/ABL, 3D/VECT: HCPCS | Performed by: INTERNAL MEDICINE

## 2020-04-09 PROCEDURE — 93662 INTRACARDIAC ECG (ICE): CPT | Performed by: INTERNAL MEDICINE

## 2020-04-09 PROCEDURE — C1730 CATH, EP, 19 OR FEW ELECT: HCPCS | Performed by: INTERNAL MEDICINE

## 2020-04-09 PROCEDURE — 85025 COMPLETE CBC W/AUTO DIFF WBC: CPT | Performed by: PHYSICIAN ASSISTANT

## 2020-04-09 PROCEDURE — 93462 L HRT CATH TRNSPTL PUNCTURE: CPT | Performed by: INTERNAL MEDICINE

## 2020-04-09 PROCEDURE — C1769 GUIDE WIRE: HCPCS | Performed by: INTERNAL MEDICINE

## 2020-04-09 PROCEDURE — 85610 PROTHROMBIN TIME: CPT | Performed by: PHYSICIAN ASSISTANT

## 2020-04-09 PROCEDURE — 80048 BASIC METABOLIC PNL TOTAL CA: CPT | Performed by: PHYSICIAN ASSISTANT

## 2020-04-09 PROCEDURE — 93005 ELECTROCARDIOGRAM TRACING: CPT

## 2020-04-09 PROCEDURE — 76937 US GUIDE VASCULAR ACCESS: CPT | Performed by: INTERNAL MEDICINE

## 2020-04-09 PROCEDURE — 93613 INTRACARDIAC EPHYS 3D MAPG: CPT | Performed by: INTERNAL MEDICINE

## 2020-04-09 PROCEDURE — 93653 COMPRE EP EVAL TX SVT: CPT | Performed by: INTERNAL MEDICINE

## 2020-04-09 PROCEDURE — 85347 COAGULATION TIME ACTIVATED: CPT

## 2020-04-09 PROCEDURE — 93623 PRGRMD STIMJ&PACG IV RX NFS: CPT | Performed by: INTERNAL MEDICINE

## 2020-04-09 PROCEDURE — C1759 CATH, INTRA ECHOCARDIOGRAPHY: HCPCS | Performed by: INTERNAL MEDICINE

## 2020-04-09 PROCEDURE — 93655 ICAR CATH ABLTJ DSCRT ARRHYT: CPT | Performed by: INTERNAL MEDICINE

## 2020-04-09 PROCEDURE — 93621 COMP EP EVL L PAC&REC C SINS: CPT | Performed by: INTERNAL MEDICINE

## 2020-04-09 RX ORDER — HEPARIN SODIUM 1000 [USP'U]/ML
INJECTION, SOLUTION INTRAVENOUS; SUBCUTANEOUS CODE/TRAUMA/SEDATION MEDICATION
Status: COMPLETED | OUTPATIENT
Start: 2020-04-09 | End: 2020-04-09

## 2020-04-09 RX ORDER — PROPOFOL 10 MG/ML
INJECTION, EMULSION INTRAVENOUS AS NEEDED
Status: DISCONTINUED | OUTPATIENT
Start: 2020-04-09 | End: 2020-04-09 | Stop reason: SURG

## 2020-04-09 RX ORDER — FENTANYL CITRATE 50 UG/ML
INJECTION, SOLUTION INTRAMUSCULAR; INTRAVENOUS AS NEEDED
Status: DISCONTINUED | OUTPATIENT
Start: 2020-04-09 | End: 2020-04-09 | Stop reason: SURG

## 2020-04-09 RX ORDER — ASPIRIN 81 MG/1
162 TABLET, CHEWABLE ORAL DAILY
Qty: 90 TABLET | Refills: 0 | Status: SHIPPED | COMMUNITY
Start: 2020-04-09 | End: 2020-10-07 | Stop reason: ALTCHOICE

## 2020-04-09 RX ORDER — SODIUM CHLORIDE 9 MG/ML
INJECTION, SOLUTION INTRAVENOUS CONTINUOUS PRN
Status: DISCONTINUED | OUTPATIENT
Start: 2020-04-09 | End: 2020-04-09 | Stop reason: SURG

## 2020-04-09 RX ORDER — LIDOCAINE HYDROCHLORIDE 10 MG/ML
INJECTION, SOLUTION EPIDURAL; INFILTRATION; INTRACAUDAL; PERINEURAL CODE/TRAUMA/SEDATION MEDICATION
Status: COMPLETED | OUTPATIENT
Start: 2020-04-09 | End: 2020-04-09

## 2020-04-09 RX ORDER — MIDAZOLAM HYDROCHLORIDE 2 MG/2ML
INJECTION, SOLUTION INTRAMUSCULAR; INTRAVENOUS AS NEEDED
Status: DISCONTINUED | OUTPATIENT
Start: 2020-04-09 | End: 2020-04-09 | Stop reason: SURG

## 2020-04-09 RX ORDER — HEPARIN SODIUM 1000 [USP'U]/ML
INJECTION, SOLUTION INTRAVENOUS; SUBCUTANEOUS AS NEEDED
Status: DISCONTINUED | OUTPATIENT
Start: 2020-04-09 | End: 2020-04-09 | Stop reason: SURG

## 2020-04-09 RX ORDER — ASPIRIN 325 MG
325 TABLET ORAL ONCE
Status: COMPLETED | OUTPATIENT
Start: 2020-04-09 | End: 2020-04-09

## 2020-04-09 RX ORDER — ACETAMINOPHEN 325 MG/1
650 TABLET ORAL EVERY 4 HOURS PRN
Status: DISCONTINUED | OUTPATIENT
Start: 2020-04-09 | End: 2020-04-09 | Stop reason: HOSPADM

## 2020-04-09 RX ORDER — ADENOSINE 3 MG/ML
INJECTION INTRAVENOUS CODE/TRAUMA/SEDATION MEDICATION
Status: COMPLETED | OUTPATIENT
Start: 2020-04-09 | End: 2020-04-09

## 2020-04-09 RX ORDER — PROPOFOL 10 MG/ML
INJECTION, EMULSION INTRAVENOUS CONTINUOUS PRN
Status: DISCONTINUED | OUTPATIENT
Start: 2020-04-09 | End: 2020-04-09 | Stop reason: SURG

## 2020-04-09 RX ORDER — PROTAMINE SULFATE 10 MG/ML
INJECTION, SOLUTION INTRAVENOUS AS NEEDED
Status: DISCONTINUED | OUTPATIENT
Start: 2020-04-09 | End: 2020-04-09 | Stop reason: SURG

## 2020-04-09 RX ADMIN — LIDOCAINE HYDROCHLORIDE 20 ML: 10 INJECTION, SOLUTION EPIDURAL; INFILTRATION; INTRACAUDAL; PERINEURAL at 11:28

## 2020-04-09 RX ADMIN — HEPARIN SODIUM 5000 UNITS: 1000 INJECTION INTRAVENOUS; SUBCUTANEOUS at 11:33

## 2020-04-09 RX ADMIN — HEPARIN SODIUM 5000 UNITS: 1000 INJECTION INTRAVENOUS; SUBCUTANEOUS at 11:48

## 2020-04-09 RX ADMIN — ADENOSINE 18 MG: 3 INJECTION INTRAVENOUS at 13:25

## 2020-04-09 RX ADMIN — PROPOFOL 70 MCG/KG/MIN: 10 INJECTION, EMULSION INTRAVENOUS at 11:48

## 2020-04-09 RX ADMIN — ADENOSINE 18 MG: 3 INJECTION INTRAVENOUS at 12:31

## 2020-04-09 RX ADMIN — PROTAMINE SULFATE 30 MG: 10 INJECTION, SOLUTION INTRAVENOUS at 13:33

## 2020-04-09 RX ADMIN — SODIUM CHLORIDE: 0.9 INJECTION, SOLUTION INTRAVENOUS at 10:49

## 2020-04-09 RX ADMIN — PHENYLEPHRINE HYDROCHLORIDE 20 MCG/MIN: 10 INJECTION INTRAVENOUS at 11:49

## 2020-04-09 RX ADMIN — PROPOFOL 90 MG: 10 INJECTION, EMULSION INTRAVENOUS at 11:28

## 2020-04-09 RX ADMIN — MIDAZOLAM 1 MG: 1 INJECTION INTRAMUSCULAR; INTRAVENOUS at 11:09

## 2020-04-09 RX ADMIN — ASPIRIN 325 MG ORAL TABLET 325 MG: 325 PILL ORAL at 14:44

## 2020-04-09 RX ADMIN — MIDAZOLAM 1 MG: 1 INJECTION INTRAMUSCULAR; INTRAVENOUS at 11:49

## 2020-04-09 RX ADMIN — HEPARIN SODIUM 2500 UNITS: 1000 INJECTION INTRAVENOUS; SUBCUTANEOUS at 12:55

## 2020-04-09 RX ADMIN — FENTANYL CITRATE 50 MCG: 50 INJECTION, SOLUTION INTRAMUSCULAR; INTRAVENOUS at 11:49

## 2020-04-09 RX ADMIN — HEPARIN SODIUM 10000 UNITS: 1000 INJECTION INTRAVENOUS; SUBCUTANEOUS at 11:34

## 2020-04-09 RX ADMIN — ADENOSINE 18 MG: 3 INJECTION INTRAVENOUS at 12:29

## 2020-04-09 RX ADMIN — ADENOSINE 18 MG: 3 INJECTION INTRAVENOUS at 13:27

## 2020-04-09 RX ADMIN — HEPARIN SODIUM 10000 UNITS: 1000 INJECTION INTRAVENOUS; SUBCUTANEOUS at 11:42

## 2020-04-09 RX ADMIN — HEPARIN SODIUM 5000 UNITS: 1000 INJECTION INTRAVENOUS; SUBCUTANEOUS at 11:49

## 2020-04-09 RX ADMIN — FENTANYL CITRATE 50 MCG: 50 INJECTION, SOLUTION INTRAMUSCULAR; INTRAVENOUS at 11:09

## 2020-04-11 LAB
ATRIAL RATE: 80 BPM
ATRIAL RATE: 81 BPM
P AXIS: 75 DEGREES
P AXIS: 77 DEGREES
PR INTERVAL: 164 MS
PR INTERVAL: 166 MS
QRS AXIS: 68 DEGREES
QRS AXIS: 71 DEGREES
QRSD INTERVAL: 76 MS
QRSD INTERVAL: 80 MS
QT INTERVAL: 382 MS
QT INTERVAL: 384 MS
QTC INTERVAL: 442 MS
QTC INTERVAL: 443 MS
T WAVE AXIS: 85 DEGREES
T WAVE AXIS: 88 DEGREES
VENTRICULAR RATE: 80 BPM
VENTRICULAR RATE: 81 BPM

## 2020-04-11 PROCEDURE — 93010 ELECTROCARDIOGRAM REPORT: CPT | Performed by: INTERNAL MEDICINE

## 2020-04-16 ENCOUNTER — TELEPHONE (OUTPATIENT)
Dept: CARDIOLOGY CLINIC | Facility: CLINIC | Age: 55
End: 2020-04-16

## 2020-05-06 VITALS — HEIGHT: 67 IN | BODY MASS INDEX: 25.69 KG/M2

## 2020-05-07 ENCOUNTER — TELEMEDICINE (OUTPATIENT)
Dept: CARDIOLOGY CLINIC | Facility: CLINIC | Age: 55
End: 2020-05-07
Payer: COMMERCIAL

## 2020-05-07 DIAGNOSIS — E03.9 ACQUIRED HYPOTHYROIDISM: ICD-10-CM

## 2020-05-07 DIAGNOSIS — I45.6 WPW (WOLFF-PARKINSON-WHITE SYNDROME): ICD-10-CM

## 2020-05-07 DIAGNOSIS — I47.1 SVT (SUPRAVENTRICULAR TACHYCARDIA) (HCC): Primary | ICD-10-CM

## 2020-05-07 DIAGNOSIS — Z98.890 S/P ABLATION OF ACCESSORY BYPASS TRACT: ICD-10-CM

## 2020-05-07 PROCEDURE — 99214 OFFICE O/P EST MOD 30 MIN: CPT | Performed by: PHYSICIAN ASSISTANT

## 2020-10-07 ENCOUNTER — OFFICE VISIT (OUTPATIENT)
Dept: CARDIOLOGY CLINIC | Facility: CLINIC | Age: 55
End: 2020-10-07
Payer: COMMERCIAL

## 2020-10-07 VITALS
BODY MASS INDEX: 24.8 KG/M2 | TEMPERATURE: 98.2 F | HEIGHT: 67 IN | SYSTOLIC BLOOD PRESSURE: 120 MMHG | DIASTOLIC BLOOD PRESSURE: 80 MMHG | WEIGHT: 158 LBS | HEART RATE: 70 BPM

## 2020-10-07 DIAGNOSIS — I47.1 SVT (SUPRAVENTRICULAR TACHYCARDIA) (HCC): Primary | ICD-10-CM

## 2020-10-07 PROCEDURE — 93000 ELECTROCARDIOGRAM COMPLETE: CPT | Performed by: PHYSICIAN ASSISTANT

## 2020-10-07 PROCEDURE — 99212 OFFICE O/P EST SF 10 MIN: CPT | Performed by: PHYSICIAN ASSISTANT

## 2020-12-03 ENCOUNTER — TELEPHONE (OUTPATIENT)
Dept: OBGYN CLINIC | Facility: CLINIC | Age: 55
End: 2020-12-03

## 2020-12-03 DIAGNOSIS — Z12.31 ENCOUNTER FOR SCREENING MAMMOGRAM FOR MALIGNANT NEOPLASM OF BREAST: Primary | ICD-10-CM

## 2021-02-26 ENCOUNTER — HOSPITAL ENCOUNTER (OUTPATIENT)
Dept: RADIOLOGY | Age: 56
Discharge: HOME | End: 2021-02-26
Payer: COMMERCIAL

## 2021-02-26 VITALS — BODY MASS INDEX: 24.8 KG/M2 | HEIGHT: 67 IN | WEIGHT: 158 LBS

## 2021-02-26 DIAGNOSIS — Z12.31 ENCOUNTER FOR SCREENING MAMMOGRAM FOR MALIGNANT NEOPLASM OF BREAST: ICD-10-CM

## 2021-02-26 PROCEDURE — 77067 SCR MAMMO BI INCL CAD: CPT

## 2021-02-26 PROCEDURE — 77063 BREAST TOMOSYNTHESIS BI: CPT

## 2021-03-01 ENCOUNTER — ANNUAL EXAM (OUTPATIENT)
Dept: OBGYN CLINIC | Facility: CLINIC | Age: 56
End: 2021-03-01
Payer: COMMERCIAL

## 2021-03-01 VITALS
SYSTOLIC BLOOD PRESSURE: 118 MMHG | BODY MASS INDEX: 26.34 KG/M2 | HEIGHT: 67 IN | WEIGHT: 167.8 LBS | DIASTOLIC BLOOD PRESSURE: 80 MMHG

## 2021-03-01 DIAGNOSIS — Z01.419 WOMEN'S ANNUAL ROUTINE GYNECOLOGICAL EXAMINATION: Primary | ICD-10-CM

## 2021-03-01 DIAGNOSIS — N81.4 UTERINE PROLAPSE: ICD-10-CM

## 2021-03-01 PROCEDURE — S0612 ANNUAL GYNECOLOGICAL EXAMINA: HCPCS | Performed by: OBSTETRICS & GYNECOLOGY

## 2021-03-01 NOTE — PROGRESS NOTES
Assessment/Plan:     the patient was informed of a stable menopausal gyn examination  Although she has some prolapse of the uterus with the cervix coming to the introitus but not through is completely asymptomatic and not interfering for lifestyle  She will watch for signs symptoms or changes  There is no change in bladder control  She states the normal colonoscopy  She she is not happy with her weight she tried exercise lose more weight  There is no problems sexuality  She feels safe at home  She is dealing well with COVID situation  She will continue see his family physician for hypothyroidism which is under control medically  Return my office in 1 year  Subjective:      Patient ID: Leidy Juárez is a 63 y o  female  HPI      This is a 50-year-old white female, she is a  2 para 2 with 2 prior vaginal deliveries  She is now in menopause  Menopause symptoms are under control  Denies any major  GI complaint  She does state her  may be having some problems performing  We had talked about the possibility being evaluated by a urologist   She is not happy with her weight she tried exercise more  She states most awaking cane but the COVID situation  She sees a dentist on a regular basis  Denies any problem depression or anxiety  She had a colonoscopy since her last visit which was normal   She feels safe at home  There are no new major family illnesses report at this time  Currently she is being treated for hypothyroidism this is working well  She will continue to get yearly mammograms  She had a history of SVT  This resolved with cardiac ablation which was performed last year she did well as happy with results      The following portions of the patient's history were reviewed and updated as appropriate: allergies, current medications, past family history, past medical history, past social history, past surgical history and problem list     Review of Systems   All other systems reviewed and are negative  Objective:      /80   Ht 5' 7" (1 702 m)   Wt 76 1 kg (167 lb 12 8 oz)   LMP 07/06/2018 (Exact Date)   BMI 26 28 kg/m²          Physical Exam  Vitals signs reviewed  Exam conducted with a chaperone present  Constitutional:       Appearance: Normal appearance  HENT:      Head: Normocephalic and atraumatic  Eyes:      Extraocular Movements: Extraocular movements intact  Neck:      Musculoskeletal: Normal range of motion and neck supple  Cardiovascular:      Rate and Rhythm: Normal rate and regular rhythm  Pulses: Normal pulses  Heart sounds: Normal heart sounds  Pulmonary:      Effort: Pulmonary effort is normal       Breath sounds: Normal breath sounds  Chest:      Breasts:         Right: Normal  No swelling, bleeding, inverted nipple, mass, nipple discharge, skin change or tenderness  Left: No swelling, bleeding, inverted nipple, mass, nipple discharge, skin change or tenderness  Abdominal:      General: Abdomen is flat  There is no distension  Palpations: Abdomen is soft  There is no hepatomegaly, splenomegaly or mass  Tenderness: There is no abdominal tenderness  There is no guarding or rebound  Hernia: No hernia is present  There is no hernia in the umbilical area, ventral area, left inguinal area or right inguinal area  Genitourinary:     General: Normal vulva  Pubic Area: No rash or pubic lice  Labia:         Right: No rash, tenderness or injury  Left: No rash, tenderness, lesion or injury  Urethra: No urethral pain, urethral swelling or urethral lesion  Vagina: Normal  No signs of injury and foreign body  No vaginal discharge, erythema, tenderness, bleeding, lesions or prolapsed vaginal walls  Cervix: No cervical motion tenderness, discharge, friability, lesion, erythema, cervical bleeding or eversion  Uterus: Normal  With uterine prolapse         Adnexa: Right adnexa normal and left adnexa normal       Rectum: Normal       Comments: The external genitalia normal limits the vagina is clean the cervix is parous  There is uterine prolapse the cervix comes down to the introitus but does not protrude  This is not interfering for lifestyle or sexuality  The adnexa clear bilaterally  The uterus is nontender there is no cervical motion tenderness  The urethra was normal in its appearance  The bladder is well supported  Musculoskeletal: Normal range of motion  Lymphadenopathy:      Upper Body:      Right upper body: No supraclavicular or axillary adenopathy  Left upper body: No supraclavicular or axillary adenopathy  Lower Body: No right inguinal adenopathy  No left inguinal adenopathy  Skin:     General: Skin is warm and dry  Neurological:      General: No focal deficit present  Mental Status: She is alert  Psychiatric:         Mood and Affect: Mood normal          Behavior: Behavior normal          Thought Content:  Thought content normal

## 2021-03-01 NOTE — PATIENT INSTRUCTIONS
The patient was informed of a stable menopausal gyn examination  The uterine prolapse is asymptomatic at the site need to be corrected surgically the present time  Is not interfering with sexuality  She had a normal colonoscopy that within the last year  Mammograms up-to-date  She is happy with her weight she tried exercise lose more  Return my office in 1 year

## 2021-04-09 DIAGNOSIS — Z23 ENCOUNTER FOR IMMUNIZATION: ICD-10-CM

## 2021-05-07 ENCOUNTER — IMMUNIZATIONS (OUTPATIENT)
Dept: FAMILY MEDICINE CLINIC | Facility: HOSPITAL | Age: 56
End: 2021-05-07

## 2021-05-07 DIAGNOSIS — Z23 ENCOUNTER FOR IMMUNIZATION: Primary | ICD-10-CM

## 2021-05-07 PROCEDURE — 0001A SARS-COV-2 / COVID-19 MRNA VACCINE (PFIZER-BIONTECH) 30 MCG: CPT

## 2021-05-07 PROCEDURE — 91300 SARS-COV-2 / COVID-19 MRNA VACCINE (PFIZER-BIONTECH) 30 MCG: CPT

## 2021-05-29 ENCOUNTER — IMMUNIZATIONS (OUTPATIENT)
Dept: FAMILY MEDICINE CLINIC | Facility: HOSPITAL | Age: 56
End: 2021-05-29

## 2021-05-29 DIAGNOSIS — Z23 ENCOUNTER FOR IMMUNIZATION: Primary | ICD-10-CM

## 2021-05-29 PROCEDURE — 91300 SARS-COV-2 / COVID-19 MRNA VACCINE (PFIZER-BIONTECH) 30 MCG: CPT

## 2021-05-29 PROCEDURE — 0002A SARS-COV-2 / COVID-19 MRNA VACCINE (PFIZER-BIONTECH) 30 MCG: CPT

## 2021-10-13 ENCOUNTER — OFFICE VISIT (OUTPATIENT)
Dept: OBGYN CLINIC | Facility: HOSPITAL | Age: 56
End: 2021-10-13
Payer: COMMERCIAL

## 2021-10-13 ENCOUNTER — OFFICE VISIT (OUTPATIENT)
Dept: OCCUPATIONAL THERAPY | Facility: HOSPITAL | Age: 56
End: 2021-10-13

## 2021-10-13 ENCOUNTER — HOSPITAL ENCOUNTER (OUTPATIENT)
Dept: RADIOLOGY | Facility: HOSPITAL | Age: 56
Discharge: HOME | End: 2021-10-13
Attending: ORTHOPAEDIC SURGERY
Payer: COMMERCIAL

## 2021-10-13 VITALS
HEIGHT: 67 IN | HEART RATE: 68 BPM | SYSTOLIC BLOOD PRESSURE: 128 MMHG | WEIGHT: 158.8 LBS | BODY MASS INDEX: 24.92 KG/M2 | DIASTOLIC BLOOD PRESSURE: 73 MMHG

## 2021-10-13 DIAGNOSIS — M25.532 LEFT WRIST PAIN: Primary | ICD-10-CM

## 2021-10-13 DIAGNOSIS — G56.22 ULNAR NEURITIS, LEFT: ICD-10-CM

## 2021-10-13 DIAGNOSIS — M25.532 PAIN IN LEFT WRIST: ICD-10-CM

## 2021-10-13 DIAGNOSIS — M25.532 PAIN IN LEFT WRIST: Primary | ICD-10-CM

## 2021-10-13 PROCEDURE — 73110 X-RAY EXAM OF WRIST: CPT

## 2021-10-13 PROCEDURE — 99213 OFFICE O/P EST LOW 20 MIN: CPT | Performed by: ORTHOPAEDIC SURGERY

## 2021-10-21 ENCOUNTER — EVALUATION (OUTPATIENT)
Dept: OCCUPATIONAL THERAPY | Age: 56
End: 2021-10-21
Payer: COMMERCIAL

## 2021-10-21 DIAGNOSIS — G56.22 ULNAR NEURITIS, LEFT: ICD-10-CM

## 2021-10-21 DIAGNOSIS — M25.532 PAIN IN LEFT WRIST: ICD-10-CM

## 2021-10-21 PROCEDURE — 97140 MANUAL THERAPY 1/> REGIONS: CPT

## 2021-10-21 PROCEDURE — 97165 OT EVAL LOW COMPLEX 30 MIN: CPT

## 2021-10-21 PROCEDURE — 97110 THERAPEUTIC EXERCISES: CPT

## 2021-10-26 ENCOUNTER — OFFICE VISIT (OUTPATIENT)
Dept: OCCUPATIONAL THERAPY | Age: 56
End: 2021-10-26
Payer: COMMERCIAL

## 2021-10-26 DIAGNOSIS — M25.532 PAIN IN LEFT WRIST: ICD-10-CM

## 2021-10-26 DIAGNOSIS — M25.532 LEFT WRIST PAIN: Primary | ICD-10-CM

## 2021-10-26 DIAGNOSIS — G56.22 ULNAR NEURITIS, LEFT: ICD-10-CM

## 2021-10-26 PROCEDURE — 97140 MANUAL THERAPY 1/> REGIONS: CPT

## 2021-10-26 PROCEDURE — 97110 THERAPEUTIC EXERCISES: CPT

## 2021-10-29 ENCOUNTER — OFFICE VISIT (OUTPATIENT)
Dept: OCCUPATIONAL THERAPY | Age: 56
End: 2021-10-29
Payer: COMMERCIAL

## 2021-10-29 DIAGNOSIS — M25.532 LEFT WRIST PAIN: Primary | ICD-10-CM

## 2021-10-29 DIAGNOSIS — M25.532 PAIN IN LEFT WRIST: ICD-10-CM

## 2021-10-29 DIAGNOSIS — G56.22 ULNAR NEURITIS, LEFT: ICD-10-CM

## 2021-10-29 PROCEDURE — 97110 THERAPEUTIC EXERCISES: CPT

## 2021-10-29 PROCEDURE — 97140 MANUAL THERAPY 1/> REGIONS: CPT

## 2021-11-02 ENCOUNTER — OFFICE VISIT (OUTPATIENT)
Dept: OCCUPATIONAL THERAPY | Age: 56
End: 2021-11-02
Payer: COMMERCIAL

## 2021-11-02 DIAGNOSIS — M25.532 LEFT WRIST PAIN: Primary | ICD-10-CM

## 2021-11-02 DIAGNOSIS — G56.22 ULNAR NEURITIS, LEFT: ICD-10-CM

## 2021-11-02 PROCEDURE — 97140 MANUAL THERAPY 1/> REGIONS: CPT

## 2021-11-02 PROCEDURE — 97110 THERAPEUTIC EXERCISES: CPT

## 2021-11-05 ENCOUNTER — APPOINTMENT (OUTPATIENT)
Dept: OCCUPATIONAL THERAPY | Age: 56
End: 2021-11-05
Payer: COMMERCIAL

## 2021-11-05 ENCOUNTER — OFFICE VISIT (OUTPATIENT)
Dept: OCCUPATIONAL THERAPY | Age: 56
End: 2021-11-05
Payer: COMMERCIAL

## 2021-11-05 DIAGNOSIS — M25.532 LEFT WRIST PAIN: Primary | ICD-10-CM

## 2021-11-05 DIAGNOSIS — G56.22 ULNAR NEURITIS, LEFT: ICD-10-CM

## 2021-11-05 DIAGNOSIS — M25.532 PAIN IN LEFT WRIST: ICD-10-CM

## 2021-11-05 PROCEDURE — 97140 MANUAL THERAPY 1/> REGIONS: CPT

## 2021-11-05 PROCEDURE — 97110 THERAPEUTIC EXERCISES: CPT

## 2021-11-09 ENCOUNTER — OFFICE VISIT (OUTPATIENT)
Dept: OCCUPATIONAL THERAPY | Age: 56
End: 2021-11-09
Payer: COMMERCIAL

## 2021-11-09 DIAGNOSIS — G56.22 ULNAR NEURITIS, LEFT: ICD-10-CM

## 2021-11-09 DIAGNOSIS — M25.532 PAIN IN LEFT WRIST: ICD-10-CM

## 2021-11-09 DIAGNOSIS — M25.532 LEFT WRIST PAIN: Primary | ICD-10-CM

## 2021-11-09 PROCEDURE — 97110 THERAPEUTIC EXERCISES: CPT

## 2021-11-09 PROCEDURE — 97140 MANUAL THERAPY 1/> REGIONS: CPT

## 2021-11-11 ENCOUNTER — OFFICE VISIT (OUTPATIENT)
Dept: OCCUPATIONAL THERAPY | Age: 56
End: 2021-11-11
Payer: COMMERCIAL

## 2021-11-11 DIAGNOSIS — G56.22 ULNAR NEURITIS, LEFT: Primary | ICD-10-CM

## 2021-11-11 DIAGNOSIS — M25.532 PAIN IN LEFT WRIST: ICD-10-CM

## 2021-11-11 DIAGNOSIS — M25.532 LEFT WRIST PAIN: ICD-10-CM

## 2021-11-11 PROCEDURE — 97110 THERAPEUTIC EXERCISES: CPT

## 2021-11-11 PROCEDURE — 97140 MANUAL THERAPY 1/> REGIONS: CPT

## 2021-11-16 ENCOUNTER — OFFICE VISIT (OUTPATIENT)
Dept: OCCUPATIONAL THERAPY | Age: 56
End: 2021-11-16
Payer: COMMERCIAL

## 2021-11-16 DIAGNOSIS — M25.532 PAIN IN LEFT WRIST: Primary | ICD-10-CM

## 2021-11-16 DIAGNOSIS — G56.22 ULNAR NEURITIS, LEFT: ICD-10-CM

## 2021-11-16 PROCEDURE — 97110 THERAPEUTIC EXERCISES: CPT

## 2021-11-16 PROCEDURE — 97140 MANUAL THERAPY 1/> REGIONS: CPT

## 2021-11-19 ENCOUNTER — OFFICE VISIT (OUTPATIENT)
Dept: OCCUPATIONAL THERAPY | Age: 56
End: 2021-11-19
Payer: COMMERCIAL

## 2021-11-19 DIAGNOSIS — G56.22 ULNAR NEURITIS, LEFT: ICD-10-CM

## 2021-11-19 DIAGNOSIS — M25.532 PAIN IN LEFT WRIST: Primary | ICD-10-CM

## 2021-11-19 PROCEDURE — 97110 THERAPEUTIC EXERCISES: CPT

## 2021-11-19 PROCEDURE — 97140 MANUAL THERAPY 1/> REGIONS: CPT

## 2021-11-22 ENCOUNTER — OFFICE VISIT (OUTPATIENT)
Dept: OCCUPATIONAL THERAPY | Age: 56
End: 2021-11-22
Payer: COMMERCIAL

## 2021-11-22 DIAGNOSIS — G56.22 ULNAR NEURITIS, LEFT: Primary | ICD-10-CM

## 2021-11-22 DIAGNOSIS — M25.532 PAIN IN LEFT WRIST: ICD-10-CM

## 2021-11-22 PROCEDURE — 97110 THERAPEUTIC EXERCISES: CPT

## 2021-11-22 PROCEDURE — 97140 MANUAL THERAPY 1/> REGIONS: CPT

## 2021-11-26 ENCOUNTER — OFFICE VISIT (OUTPATIENT)
Dept: OCCUPATIONAL THERAPY | Age: 56
End: 2021-11-26
Payer: COMMERCIAL

## 2021-11-26 DIAGNOSIS — G56.22 ULNAR NEURITIS, LEFT: ICD-10-CM

## 2021-11-26 DIAGNOSIS — M25.532 PAIN IN LEFT WRIST: Primary | ICD-10-CM

## 2021-11-26 PROCEDURE — 97110 THERAPEUTIC EXERCISES: CPT

## 2021-11-26 PROCEDURE — 97140 MANUAL THERAPY 1/> REGIONS: CPT

## 2021-11-30 ENCOUNTER — OFFICE VISIT (OUTPATIENT)
Dept: OCCUPATIONAL THERAPY | Age: 56
End: 2021-11-30
Payer: COMMERCIAL

## 2021-11-30 DIAGNOSIS — M25.532 PAIN IN LEFT WRIST: Primary | ICD-10-CM

## 2021-11-30 DIAGNOSIS — G56.22 ULNAR NEURITIS, LEFT: ICD-10-CM

## 2021-11-30 DIAGNOSIS — M25.532 LEFT WRIST PAIN: ICD-10-CM

## 2021-11-30 PROCEDURE — 97110 THERAPEUTIC EXERCISES: CPT

## 2021-11-30 PROCEDURE — 97140 MANUAL THERAPY 1/> REGIONS: CPT

## 2021-12-02 ENCOUNTER — APPOINTMENT (OUTPATIENT)
Dept: OCCUPATIONAL THERAPY | Age: 56
End: 2021-12-02
Payer: COMMERCIAL

## 2021-12-03 ENCOUNTER — OFFICE VISIT (OUTPATIENT)
Dept: OCCUPATIONAL THERAPY | Age: 56
End: 2021-12-03
Payer: COMMERCIAL

## 2021-12-03 DIAGNOSIS — G56.22 ULNAR NEURITIS, LEFT: ICD-10-CM

## 2021-12-03 DIAGNOSIS — M25.532 PAIN IN LEFT WRIST: Primary | ICD-10-CM

## 2021-12-03 DIAGNOSIS — M25.532 LEFT WRIST PAIN: ICD-10-CM

## 2021-12-03 PROCEDURE — 97140 MANUAL THERAPY 1/> REGIONS: CPT

## 2021-12-03 PROCEDURE — 97530 THERAPEUTIC ACTIVITIES: CPT

## 2021-12-03 PROCEDURE — 97110 THERAPEUTIC EXERCISES: CPT

## 2021-12-07 ENCOUNTER — OFFICE VISIT (OUTPATIENT)
Dept: OCCUPATIONAL THERAPY | Age: 56
End: 2021-12-07
Payer: COMMERCIAL

## 2021-12-07 DIAGNOSIS — M25.532 LEFT WRIST PAIN: ICD-10-CM

## 2021-12-07 DIAGNOSIS — G56.22 ULNAR NEURITIS, LEFT: Primary | ICD-10-CM

## 2021-12-07 DIAGNOSIS — M25.532 PAIN IN LEFT WRIST: ICD-10-CM

## 2021-12-07 PROCEDURE — 97140 MANUAL THERAPY 1/> REGIONS: CPT

## 2021-12-07 PROCEDURE — 97110 THERAPEUTIC EXERCISES: CPT

## 2021-12-08 ENCOUNTER — OFFICE VISIT (OUTPATIENT)
Dept: OBGYN CLINIC | Facility: HOSPITAL | Age: 56
End: 2021-12-08
Payer: COMMERCIAL

## 2021-12-08 ENCOUNTER — OFFICE VISIT (OUTPATIENT)
Dept: OCCUPATIONAL THERAPY | Facility: HOSPITAL | Age: 56
End: 2021-12-08
Payer: COMMERCIAL

## 2021-12-08 VITALS
HEART RATE: 71 BPM | WEIGHT: 160.8 LBS | SYSTOLIC BLOOD PRESSURE: 116 MMHG | DIASTOLIC BLOOD PRESSURE: 72 MMHG | BODY MASS INDEX: 25.24 KG/M2 | HEIGHT: 67 IN

## 2021-12-08 DIAGNOSIS — M25.532 LEFT WRIST PAIN: ICD-10-CM

## 2021-12-08 DIAGNOSIS — M77.8 LEFT WRIST TENDINITIS: ICD-10-CM

## 2021-12-08 DIAGNOSIS — G56.22 ULNAR NEURITIS, LEFT: Primary | ICD-10-CM

## 2021-12-08 DIAGNOSIS — M25.532 PAIN IN LEFT WRIST: ICD-10-CM

## 2021-12-08 PROCEDURE — 99214 OFFICE O/P EST MOD 30 MIN: CPT | Performed by: ORTHOPAEDIC SURGERY

## 2021-12-08 PROCEDURE — 20550 NJX 1 TENDON SHEATH/LIGAMENT: CPT | Performed by: ORTHOPAEDIC SURGERY

## 2021-12-08 PROCEDURE — L3906 WHO W/O JOINTS CF: HCPCS

## 2021-12-08 RX ORDER — LIDOCAINE HYDROCHLORIDE 10 MG/ML
1 INJECTION, SOLUTION INFILTRATION; PERINEURAL
Status: COMPLETED | OUTPATIENT
Start: 2021-12-08 | End: 2021-12-08

## 2021-12-08 RX ORDER — BETAMETHASONE SODIUM PHOSPHATE AND BETAMETHASONE ACETATE 3; 3 MG/ML; MG/ML
6 INJECTION, SUSPENSION INTRA-ARTICULAR; INTRALESIONAL; INTRAMUSCULAR; SOFT TISSUE
Status: COMPLETED | OUTPATIENT
Start: 2021-12-08 | End: 2021-12-08

## 2021-12-08 RX ADMIN — BETAMETHASONE SODIUM PHOSPHATE AND BETAMETHASONE ACETATE 6 MG: 3; 3 INJECTION, SUSPENSION INTRA-ARTICULAR; INTRALESIONAL; INTRAMUSCULAR; SOFT TISSUE at 09:43

## 2021-12-08 RX ADMIN — LIDOCAINE HYDROCHLORIDE 1 ML: 10 INJECTION, SOLUTION INFILTRATION; PERINEURAL at 09:43

## 2021-12-10 ENCOUNTER — OFFICE VISIT (OUTPATIENT)
Dept: OCCUPATIONAL THERAPY | Age: 56
End: 2021-12-10
Payer: COMMERCIAL

## 2021-12-10 DIAGNOSIS — M25.532 LEFT WRIST PAIN: ICD-10-CM

## 2021-12-10 DIAGNOSIS — M25.532 PAIN IN LEFT WRIST: ICD-10-CM

## 2021-12-10 DIAGNOSIS — G56.22 ULNAR NEURITIS, LEFT: Primary | ICD-10-CM

## 2021-12-10 PROCEDURE — 97110 THERAPEUTIC EXERCISES: CPT

## 2021-12-14 ENCOUNTER — OFFICE VISIT (OUTPATIENT)
Dept: OCCUPATIONAL THERAPY | Age: 56
End: 2021-12-14
Payer: COMMERCIAL

## 2021-12-14 DIAGNOSIS — M25.532 PAIN IN LEFT WRIST: Primary | ICD-10-CM

## 2021-12-14 PROCEDURE — 97140 MANUAL THERAPY 1/> REGIONS: CPT

## 2021-12-14 PROCEDURE — 97110 THERAPEUTIC EXERCISES: CPT

## 2021-12-16 ENCOUNTER — OFFICE VISIT (OUTPATIENT)
Dept: OCCUPATIONAL THERAPY | Age: 56
End: 2021-12-16
Payer: COMMERCIAL

## 2021-12-16 DIAGNOSIS — M25.532 PAIN IN LEFT WRIST: ICD-10-CM

## 2021-12-16 DIAGNOSIS — G56.22 ULNAR NEURITIS, LEFT: Primary | ICD-10-CM

## 2021-12-16 DIAGNOSIS — M25.532 LEFT WRIST PAIN: ICD-10-CM

## 2021-12-16 PROCEDURE — 97110 THERAPEUTIC EXERCISES: CPT

## 2021-12-16 PROCEDURE — 97140 MANUAL THERAPY 1/> REGIONS: CPT

## 2021-12-21 ENCOUNTER — OFFICE VISIT (OUTPATIENT)
Dept: OCCUPATIONAL THERAPY | Age: 56
End: 2021-12-21
Payer: COMMERCIAL

## 2021-12-21 DIAGNOSIS — M25.532 PAIN IN LEFT WRIST: ICD-10-CM

## 2021-12-21 DIAGNOSIS — M25.532 LEFT WRIST PAIN: ICD-10-CM

## 2021-12-21 DIAGNOSIS — G56.22 ULNAR NEURITIS, LEFT: Primary | ICD-10-CM

## 2021-12-21 PROCEDURE — 97110 THERAPEUTIC EXERCISES: CPT

## 2021-12-21 PROCEDURE — 97140 MANUAL THERAPY 1/> REGIONS: CPT

## 2021-12-28 ENCOUNTER — OFFICE VISIT (OUTPATIENT)
Dept: OCCUPATIONAL THERAPY | Age: 56
End: 2021-12-28
Payer: COMMERCIAL

## 2021-12-28 DIAGNOSIS — G56.22 ULNAR NEURITIS, LEFT: Primary | ICD-10-CM

## 2021-12-28 DIAGNOSIS — M25.532 LEFT WRIST PAIN: ICD-10-CM

## 2021-12-28 DIAGNOSIS — M25.532 PAIN IN LEFT WRIST: ICD-10-CM

## 2021-12-28 PROCEDURE — 97110 THERAPEUTIC EXERCISES: CPT

## 2021-12-28 PROCEDURE — 97140 MANUAL THERAPY 1/> REGIONS: CPT

## 2022-02-03 ENCOUNTER — TELEPHONE (OUTPATIENT)
Dept: OBGYN CLINIC | Facility: HOSPITAL | Age: 57
End: 2022-02-03

## 2022-02-03 NOTE — TELEPHONE ENCOUNTER
Patient has an appt tomorrow with Dr Ann Lomas and needs to reschedule the appt  She has a conflict in her schedule  (I cxld the appt)      Callback DC#[phone]

## 2022-02-07 ENCOUNTER — OFFICE VISIT (OUTPATIENT)
Dept: OBGYN CLINIC | Facility: HOSPITAL | Age: 57
End: 2022-02-07
Payer: COMMERCIAL

## 2022-02-07 VITALS
HEIGHT: 67 IN | SYSTOLIC BLOOD PRESSURE: 144 MMHG | BODY MASS INDEX: 25.11 KG/M2 | WEIGHT: 160 LBS | DIASTOLIC BLOOD PRESSURE: 70 MMHG | HEART RATE: 71 BPM

## 2022-02-07 DIAGNOSIS — M77.8 LEFT WRIST TENDINITIS: Primary | ICD-10-CM

## 2022-02-07 PROCEDURE — 99213 OFFICE O/P EST LOW 20 MIN: CPT | Performed by: PHYSICIAN ASSISTANT

## 2022-02-07 RX ORDER — LEVOTHYROXINE SODIUM 112 UG/1
112 TABLET ORAL DAILY
COMMUNITY
Start: 2022-01-27

## 2022-02-07 NOTE — PROGRESS NOTES
CHIEF COMPLAINT:  Chief Complaint   Patient presents with    Left Wrist - Pain       SUBJECTIVE:  Jordan Pal is a 61y o  year old female who presents for follow-up regarding left wrist pain  Patient was previously seen by Dr Moi Prescott and treated for left ulnar neuritis and left ECU tendonitis  She was given a cortisone injection in December  She states that she still has continued relief of her pain and discomfort  She states that she does not have numbness or tingling at nighttime  She states she continues to note some discomfort only when at work  She states when she works from home she was able to set up her home office that it is ergonomically better for her  She would like a note to get this set up for her office at work  She denies any numbness or tingling  PAST MEDICAL HISTORY:  Past Medical History:   Diagnosis Date    Arrhythmia     Disease of thyroid gland     Tachycardia        PAST SURGICAL HISTORY:  History reviewed  No pertinent surgical history      FAMILY HISTORY:  Family History   Problem Relation Age of Onset    Colon cancer Father 67    No Known Problems Mother     No Known Problems Sister     No Known Problems Daughter     No Known Problems Daughter     No Known Problems Maternal Aunt     No Known Problems Paternal Aunt     No Known Problems Maternal Grandmother     No Known Problems Maternal Grandfather     No Known Problems Paternal Grandmother     No Known Problems Paternal Grandfather     Cancer Cousin         mouth       SOCIAL HISTORY:  Social History     Tobacco Use    Smoking status: Never Smoker    Smokeless tobacco: Never Used   Vaping Use    Vaping Use: Never used   Substance Use Topics    Alcohol use: Yes     Comment: social    Drug use: Never       MEDICATIONS:    Current Outpatient Medications:     calcium carbonate (OS-MAGDALENA) 1250 (500 Ca) MG chewable tablet, Chew 1 tablet daily, Disp: , Rfl:     fluticasone (FLONASE) 50 mcg/act nasal spray, into each nostril, Disp: , Rfl:     levothyroxine 112 mcg tablet, Take 112 mcg by mouth in the morning, Disp: , Rfl:     Multiple Vitamin (DAILY VALUE MULTIVITAMIN) TABS, Take by mouth, Disp: , Rfl:     levothyroxine 100 mcg tablet, 112 mcg   (Patient not taking: Reported on 2/7/2022 ), Disp: , Rfl:     ALLERGIES:  Allergies   Allergen Reactions    Erythromycin     Penicillins     Sulfa Antibiotics     Trimethoprim        REVIEW OF SYSTEMS:  Review of Systems  ROS:   General: no fever, no chills  HEENT:  No loss of hearing or eyesight problems  Eyes:  No red eyes  Respiratory:  No coughing, shortness of breath or wheezing  Cardiovascular:  No chest pain, no palpitations  GI:  Abdomen soft nontender, denies nausea  Endocrine:  No muscle weakness, no frequent urination, no excessive thirst  Urinary:  No dysuria, no incontinence  Musculoskeletal: see HPI and PE  SKIN:  No skin rash, no dry skin  Neurological:  No headaches, no confusion  Psychiatric:  No suicide thoughts, no anxiety, no depression  Review of all other systems is negative    VITALS:  Vitals:    02/07/22 1422   BP: 144/70   Pulse: 71       LABS:  HgA1c: No results found for: HGBA1C  BMP:   Lab Results   Component Value Date    CALCIUM 9 3 04/09/2020    K 3 6 04/09/2020    CO2 27 04/09/2020     04/09/2020    BUN 14 04/09/2020    CREATININE 0 83 04/09/2020       _____________________________________________________  PHYSICAL EXAMINATION:  General: well developed and well nourished, alert, oriented times 3 and appears comfortable  Psychiatric: Normal  HEENT: Trachea Midline, No torticollis  Pulmonary: No audible wheezing or respiratory distress   Skin: No masses, erythema, lacerations, fluctation, ulcerations  Neurovascular: Sensation Intact to the Median, Ulnar, Radial Nerve, Motor Intact to the Median, Ulnar, Radial Nerve and Pulses Intact    MUSCULOSKELETAL EXAMINATION:  Left wrist   No erythema edema or ecchymosis noted, skin is warm to touch   Mild tenderness to palpation over the ECU tendon   Mild tenderness with wrist extension and ulnar deviation   Strength is 5/5 in all directions   Capillary refill less than 2 seconds   2+ radial pulse    ___________________________________________________  STUDIES REVIEWED:  No studies reviewed  PROCEDURES PERFORMED:  Procedures  No Procedures performed today    _____________________________________________________  ASSESSMENT/PLAN:      Left wrist ECU tendinitis, left ulnar neuritis  · Patient was advised to continue home exercise program  · She was also given a PT/OT script if HEP was discarded   · She was given a work note to help with the setup of an ergonomic station while at work as she does have this ability while at home and helps to prevent her symptoms  · She was advise that if her work needs anything more specific in the work note, 1 can be provided  · She will follow-up with us as needed        Follow Up:  Return if symptoms worsen or fail to improve  Portions of the record may have been created with voice recognition software  Occasional wrong word or "sound a like" substitutions may have occurred due to the inherent limitations of voice recognition software  Read the chart carefully and recognize, using context, where substitutions have occurred

## 2022-02-07 NOTE — LETTER
February 7, 2022     Patient: Cathy Feliz   YOB: 1965   Date of Visit: 2/7/2022       To Whom it May Concern:    Romario Mcdonough is under my professional care  She was seen in my office on 2/7/2022  She has Left Extensor carpi ulnaris (ECU) tendonitis that was relieved with a cortisone injection and occupational therapy  She noted that her home setup from an ergonomically standpoint worked out well to help reduce her symptoms  She does note that when she is in the office, she will have an increase in her symptoms which exacerbate her current condition  Please could we try to replicate her home office set up to alleviate symptoms while in the office  If you have any questions or concerns, please don't hesitate to call  Sincerely,          Alondra Maya PA-C        CC: No Recipients

## 2022-02-17 ENCOUNTER — EVALUATION (OUTPATIENT)
Dept: OCCUPATIONAL THERAPY | Age: 57
End: 2022-02-17
Payer: COMMERCIAL

## 2022-02-17 DIAGNOSIS — M77.8 LEFT WRIST TENDINITIS: ICD-10-CM

## 2022-02-17 PROCEDURE — 97165 OT EVAL LOW COMPLEX 30 MIN: CPT

## 2022-02-17 NOTE — PROGRESS NOTES
OT Re-Evaluation     Today's date: 2022  Patient name: Malou Marie  : 1965  MRN: [id number]  Referring provider: Anna Cortez PA-C  Dx:   Encounter Diagnosis     ICD-10-CM    1  Left wrist tendinitis  M77 8 Ambulatory Referral to PT/OT Hand Therapy                  Assessment  Assessment details: Svetlana Pandey presents with painful ROM specifically with ulnar deviation and slight weakness  At this time she would like to proceed with an HEP which was provided and follow up in 2 weeks for RE and upgrade  Impairments: abnormal or restricted ROM, impaired physical strength, lacks appropriate home exercise program and pain with function    Goals  STG 1: Decrease overall pain to 3/10 in 4-6 weeks  STG 2: Maintain overall strength for in 4-6 weeks  STG 3: Compliant with HEP in 2 weeks  LTG 1: Complete all ADL/IADLs improved to prior level of function within 6-8 weeks   LTG 2: Leisure/social skills improved within 6-8 weeks   LTG 3: Work skills improved to prior level of function within 6-8 weeks    Patient Goal: To get rid of the pain    Goals, plan of care and treatment condition discussed with patient  Patient expresses their understanding and questions regarding these isues were addressed  Plan  Patient would benefit from: OT eval, skilled occupational therapy and custom splinting  Planned modality interventions: thermotherapy: hydrocollator packs, thermotherapy: paraffin bath and fluidotherapy  Planned therapy interventions: joint mobilization, manual therapy, Carpenter taping, home exercise program, graded exercise, graded activity, functional ROM exercises, therapeutic exercise, therapeutic activities and fine motor coordination training  Frequency: 1x week  Treatment plan discussed with: patient        Subjective Evaluation    History of Present Illness  Onset date: 2021  Mechanism of injury: Svetlana Pandey presents with pain which she attributes to working from home and the set up   This has been occurring since 2021  She has been using towel splints and reports it has improved her paresthesias   Pain  Current pain ratin  At best pain ratin  At worst pain ratin  Quality: dull ache    Social Support    Employment status: working ([address] [place] [address] [employer])  Hand dominance: left          Objective     Tenderness     Left Wrist/Hand   Tenderness in the TFCC  Neurological Testing     Sensation     Wrist/Hand   Left   Intact: light touch and hot/cold discrimination    Active Range of Motion     Left Wrist   Wrist flexion: WFL  Wrist extension: WFL  Radial deviation: WFL  Ulnar deviation: WFL and with pain      Left Thumb   Opposition: Kapanji's 10    Additional Active Range of Motion Details  Full Composite Fist    Strength/Myotome Testing     Left Wrist/Hand      (2nd hand position)     Trial 1: 58 2    Right Wrist/Hand      (2nd hand position)     Trial 1: 51 7    Tests     Left Elbow   Negative Cozen's and Tinel's sign (cubital tunnel)  Left Wrist/Hand   Negative TFCC load       Additional Tests Details  Denies N/T  (-) Guyon's Canal              Precautions: Universal      Manuals                                                                 Neuro Re-Ed                                                                                                        Ther Ex                                                                                                        HEP: Wrist Isometrics, Nirschls, Paraffin             Ther Activity                                       Gait Training                                       Modalities

## 2022-02-28 ENCOUNTER — HOSPITAL ENCOUNTER (OUTPATIENT)
Dept: RADIOLOGY | Age: 57
Discharge: HOME | End: 2022-02-28
Payer: COMMERCIAL

## 2022-02-28 VITALS — BODY MASS INDEX: 25.11 KG/M2 | WEIGHT: 160 LBS | HEIGHT: 67 IN

## 2022-02-28 DIAGNOSIS — Z12.31 ENCOUNTER FOR SCREENING MAMMOGRAM FOR MALIGNANT NEOPLASM OF BREAST: ICD-10-CM

## 2022-02-28 PROCEDURE — 77067 SCR MAMMO BI INCL CAD: CPT

## 2022-02-28 PROCEDURE — 77063 BREAST TOMOSYNTHESIS BI: CPT

## 2022-03-03 ENCOUNTER — OFFICE VISIT (OUTPATIENT)
Dept: OCCUPATIONAL THERAPY | Age: 57
End: 2022-03-03
Payer: COMMERCIAL

## 2022-03-03 DIAGNOSIS — M77.8 LEFT WRIST TENDINITIS: Primary | ICD-10-CM

## 2022-03-03 PROCEDURE — 97110 THERAPEUTIC EXERCISES: CPT

## 2022-03-03 PROCEDURE — 97140 MANUAL THERAPY 1/> REGIONS: CPT

## 2022-03-03 NOTE — PROGRESS NOTES
Daily Note     Today's date: 3/3/2022  Patient name: Jose Daniel Nina  : 1965  MRN: [id number]  Referring provider: Jackie Crews PA-C  Dx:   Encounter Diagnosis     ICD-10-CM    1  Left wrist tendinitis  M77 8                   Subjective: It's really getting better      Objective: See treatment diary below      Assessment: Tolerated treatment well  Patient would benefit from continued OT  Slight tenderness with palpation of TFCC and ECU insertion  She reports wearing the splint as needed, especially for typing secondary to having slight pain with SF abduction  She reports beginning to wean out of the splint  Instructed on upgraded HEP with active wrist strengthening with RTB  She was instructed to continue to the isometrics for 2 more weeks then slowly transition to active strengthening  Plan: Continue per plan of care        Precautions: Universal      Manuals 2/17 3/3           STM  10'                                                  Neuro Re-Ed                                                                                                        Ther Ex                                                                                                        HEP: Wrist Isometrics, Nirschls, Paraffin  Wrist Strengthening           Ther Activity                                       Gait Training                                       Modalities

## 2022-03-29 ENCOUNTER — ANNUAL EXAM (OUTPATIENT)
Dept: OBGYN CLINIC | Facility: CLINIC | Age: 57
End: 2022-03-29
Payer: COMMERCIAL

## 2022-03-29 VITALS
DIASTOLIC BLOOD PRESSURE: 94 MMHG | SYSTOLIC BLOOD PRESSURE: 156 MMHG | BODY MASS INDEX: 26.24 KG/M2 | WEIGHT: 167.2 LBS | HEIGHT: 67 IN

## 2022-03-29 DIAGNOSIS — Z12.31 ENCOUNTER FOR SCREENING MAMMOGRAM FOR MALIGNANT NEOPLASM OF BREAST: ICD-10-CM

## 2022-03-29 DIAGNOSIS — Z01.419 WOMEN'S ANNUAL ROUTINE GYNECOLOGICAL EXAMINATION: Primary | ICD-10-CM

## 2022-03-29 PROCEDURE — S0612 ANNUAL GYNECOLOGICAL EXAMINA: HCPCS | Performed by: OBSTETRICS & GYNECOLOGY

## 2022-03-29 NOTE — PATIENT INSTRUCTIONS
The patient was informed of a stable menopausal gyn examination  There was some evidence of pelvic floor relaxation  She will watch for signs or discomfort bladder control or bowel movements  She should return my office in 1 year  She will continue get yearly mammograms  Her initial blood pressure was 156/94  After her exam was in repeat blood pressure was 140/84  She will continue to monitor her blood pressure at home  Elevation persists she will check in with her primary care physician

## 2022-03-29 NOTE — PROGRESS NOTES
Assessment/Plan:    The patient was informed of a stable menopausal gyn examination  A Pap smear was not performed  There is some evidence of uterine relaxation since the cervical os comes to the midportion of the vagina  This is not interfering with intimacy her bladder control  Will continue watch and observe  She she will continue to get yearly mammograms  Colonoscopies are up-to-date  She return my office in 1 year  She will continue see her dentist on a regular basis  Subjective:      Patient ID: Jordan Pla is a 61 y o  female  HPI    This is a 79-year-old white female, she is a  2 para 2 with 2 prior vaginal deliveries  She is now in menopause  Menopause symptoms under control  Last year her  was explaining some problems with erectile dysfunction  He is now on medication is doing much better  They are both happy with results  She denies any major  GI complaints  Colonoscopies up-to-date  Her weight is consistent has not changed  She feels safe at home  She has completed her COVID vaccine  She has a dentist on a regular basis  Currently a medical problems include hypothyroidism which is being treated for  She was noted today that her blood pressure was 156/94  She was nervous about her mammogram   She was shown the results her mammogram which were normal   We will check her blood pressure at the end that is visit  There are no new major family illnesses report  She denies any problem depression or anxiety  The following portions of the patient's history were reviewed and updated as appropriate: allergies, current medications, past family history, past medical history, past social history, past surgical history and problem list     Review of Systems   All other systems reviewed and are negative          Objective:      /94   Ht 5' 7" (1 702 m)   Wt 75 8 kg (167 lb 3 2 oz)   LMP 2018 (Exact Date)   BMI 26 19 kg/m²          Physical Exam  Vitals reviewed  Exam conducted with a chaperone present  Constitutional:       Appearance: Normal appearance  She is normal weight  HENT:      Head: Normocephalic and atraumatic  Eyes:      Extraocular Movements: Extraocular movements intact  Pupils: Pupils are equal, round, and reactive to light  Cardiovascular:      Rate and Rhythm: Normal rate and regular rhythm  Pulses: Normal pulses  Heart sounds: Normal heart sounds  Pulmonary:      Effort: Pulmonary effort is normal       Breath sounds: Normal breath sounds  Chest:   Breasts: Breasts are symmetrical       Right: Normal  No swelling, bleeding, inverted nipple, mass, nipple discharge, skin change, tenderness, axillary adenopathy or supraclavicular adenopathy  Left: Normal  No swelling, bleeding, inverted nipple, mass, nipple discharge, skin change, tenderness, axillary adenopathy or supraclavicular adenopathy  Abdominal:      General: Abdomen is flat  Bowel sounds are normal  There is no distension  Palpations: Abdomen is soft  There is no hepatomegaly, splenomegaly or mass  Tenderness: There is no abdominal tenderness  There is no guarding or rebound  Hernia: No hernia is present  There is no hernia in the left inguinal area or right inguinal area  Genitourinary:     General: Normal vulva  Pubic Area: No rash or pubic lice  Labia:         Right: No rash, tenderness, lesion or injury  Left: No rash, tenderness, lesion or injury  Rectum: Normal       Comments: The external genitalia normal limits the vagina is clean  A Pap smear was not performed  The uterus is anterior normal size is no cervical motion tenderness  There is slight evidence of pelvic floor relaxation  The cervix comes down into the midportion of the vagina  This is not causing any problems with bladder control  There is no obvious still noted  The adnexa clear bilaterally    Musculoskeletal: General: No swelling or deformity  Normal range of motion  Cervical back: Normal range of motion and neck supple  Lymphadenopathy:      Upper Body:      Right upper body: No supraclavicular or axillary adenopathy  Left upper body: No supraclavicular or axillary adenopathy  Lower Body: No right inguinal adenopathy  No left inguinal adenopathy  Skin:     General: Skin is warm and dry  Neurological:      General: No focal deficit present  Mental Status: She is alert and oriented to person, place, and time  Psychiatric:         Mood and Affect: Mood normal          Behavior: Behavior normal          Thought Content:  Thought content normal

## 2023-03-03 ENCOUNTER — HOSPITAL ENCOUNTER (OUTPATIENT)
Dept: RADIOLOGY | Age: 58
Discharge: HOME | End: 2023-03-03

## 2023-03-03 VITALS — HEIGHT: 67 IN | WEIGHT: 160 LBS | BODY MASS INDEX: 25.11 KG/M2

## 2023-03-03 DIAGNOSIS — Z12.31 ENCOUNTER FOR SCREENING MAMMOGRAM FOR MALIGNANT NEOPLASM OF BREAST: ICD-10-CM

## 2023-03-30 ENCOUNTER — ANNUAL EXAM (OUTPATIENT)
Dept: OBGYN CLINIC | Facility: CLINIC | Age: 58
End: 2023-03-30

## 2023-03-30 VITALS
HEIGHT: 67 IN | SYSTOLIC BLOOD PRESSURE: 126 MMHG | BODY MASS INDEX: 26.24 KG/M2 | DIASTOLIC BLOOD PRESSURE: 78 MMHG | WEIGHT: 167.2 LBS

## 2023-03-30 DIAGNOSIS — Z01.419 WOMEN'S ANNUAL ROUTINE GYNECOLOGICAL EXAMINATION: Primary | ICD-10-CM

## 2023-03-30 DIAGNOSIS — Z12.31 ENCOUNTER FOR SCREENING MAMMOGRAM FOR MALIGNANT NEOPLASM OF BREAST: ICD-10-CM

## 2023-03-30 NOTE — PROGRESS NOTES
"Assessment/Plan:    The patient was informed of a stable menopausal GYN examination  There is some uterine prolapse  Not interfering with her lifestyle  Not interfere with intimacy  Not causing any kind of bladder issues  She was informed that these problems do exist she be a candidate for vaginal hysterectomy  She is content with her weight  She feels safe at home  She sees a dentist on a regular basis  Colonoscopy is up-to-date  There is no problem with depression  Her SVT is under control  Her hypothyroidism is being treated  She is content with her weight  She should return to my office in 1 year unless new issues occur  Subjective:      Patient ID: Radha Ko is a 60 y o  female  HPI    This is a 39-year-old white female, she is a  2 para 2 with 2 prior vaginal deliveries  She is now menopausal   She is still sexually active  There is no problem with intimacy  Menopause symptoms are under control  She does have a history of hypothyroidism which is being treated  She is content with her weight  She feels safe at home  Colonoscopy is up-to-date  Mammograms up-to-date  Denies any prior depression or anxiety  She sees a dentist on a regular basis  Family history is positive for an elderly father was in his [de-identified] who has a history of vascular dementia but still hanging in there  She will need a Pap smear today  She denies any major  or GI complaint  No problem with abnormal vaginal itching burning or discharge  The following portions of the patient's history were reviewed and updated as appropriate: allergies, current medications, past family history, past medical history, past social history, past surgical history and problem list     Review of Systems   All other systems reviewed and are negative          Objective:      /78   Ht 5' 7\" (1 702 m)   Wt 75 8 kg (167 lb 3 2 oz)   LMP 2018 (Exact Date)   BMI 26 19 kg/m²          Physical " Exam  Vitals reviewed  Exam conducted with a chaperone present  Constitutional:       Appearance: Normal appearance  She is normal weight  HENT:      Head: Normocephalic and atraumatic  Nose: Nose normal       Mouth/Throat:      Mouth: Mucous membranes are moist    Eyes:      Conjunctiva/sclera: Conjunctivae normal       Pupils: Pupils are equal, round, and reactive to light  Cardiovascular:      Rate and Rhythm: Normal rate and regular rhythm  Pulses: Normal pulses  Heart sounds: Normal heart sounds  Pulmonary:      Effort: Pulmonary effort is normal       Breath sounds: Normal breath sounds  Chest:   Breasts:     Breasts are symmetrical       Right: Normal  No swelling, bleeding, inverted nipple, mass, nipple discharge, skin change or tenderness  Left: Normal  No swelling, bleeding, inverted nipple, mass, nipple discharge, skin change or tenderness  Abdominal:      General: Abdomen is flat  Bowel sounds are normal       Palpations: Abdomen is soft  There is no hepatomegaly, splenomegaly or mass  Hernia: No hernia is present  There is no hernia in the left inguinal area or right inguinal area  Genitourinary:     General: Normal vulva  Pubic Area: No rash or pubic lice  Labia:         Right: No rash, tenderness, lesion or injury  Left: No rash, tenderness, lesion or injury  Urethra: No prolapse, urethral pain, urethral swelling or urethral lesion  Vagina: Normal  No signs of injury and foreign body  No vaginal discharge, erythema, tenderness, bleeding, lesions or prolapsed vaginal walls  Cervix: Normal       Uterus: Normal  Not deviated, not enlarged, not fixed, not tender and no uterine prolapse  Adnexa: Right adnexa normal and left adnexa normal         Right: No mass, tenderness or fullness  Left: No mass, tenderness or fullness          Rectum: Normal       Comments: External genitalia within normal limits, the vagina is clean   There are some slight uterine prolapse  It is not interfering with her lifestyle with intimacy or bladder control  Is almost to the introitus  A Pap smear was performed  The adnexa clear bilaterally  The urethra and bladder are normal working relationship  The uterus is normal size  Musculoskeletal:         General: Normal range of motion  Cervical back: Normal range of motion  Lymphadenopathy:      Upper Body:      Right upper body: No supraclavicular or axillary adenopathy  Left upper body: No supraclavicular or axillary adenopathy  Lower Body: No left inguinal adenopathy  Skin:     General: Skin is warm and dry  Neurological:      General: No focal deficit present  Mental Status: She is alert and oriented to person, place, and time  Psychiatric:         Mood and Affect: Mood normal          Behavior: Behavior normal          Thought Content:  Thought content normal

## 2023-03-30 NOTE — PATIENT INSTRUCTIONS
The patient was informed of a stable menopausal GYN examination  She was informed of the findings of uterine prolapse  This is not interfering with her lifestyle  If she would notice discomfort with intercourse or problems with bladder control we will strongly consider a vaginal hysterectomy  She will continue to get her yearly mammograms  She also continues to dentist on a regular basis  She feels safe at home  She will continue to be followed by primary care physician for her SVT and hypothyroidism  Return to my office in 1 year  A Pap smear was performed today

## 2023-04-06 LAB
LAB AP GYN PRIMARY INTERPRETATION: NORMAL
Lab: NORMAL

## 2023-05-17 ENCOUNTER — HOSPITAL ENCOUNTER (OUTPATIENT)
Dept: NON INVASIVE DIAGNOSTICS | Facility: CLINIC | Age: 58
Discharge: HOME | End: 2023-05-17

## 2023-05-17 DIAGNOSIS — R00.2 PALPITATIONS: ICD-10-CM

## 2024-03-04 ENCOUNTER — HOSPITAL ENCOUNTER (OUTPATIENT)
Dept: RADIOLOGY | Facility: IMAGING CENTER | Age: 59
Discharge: HOME | End: 2024-03-04
Payer: COMMERCIAL

## 2024-03-04 VITALS — HEIGHT: 67 IN | BODY MASS INDEX: 24.8 KG/M2 | WEIGHT: 158 LBS

## 2024-03-04 DIAGNOSIS — Z12.31 ENCOUNTER FOR SCREENING MAMMOGRAM FOR MALIGNANT NEOPLASM OF BREAST: ICD-10-CM

## 2024-03-04 DIAGNOSIS — Z01.419 WOMEN'S ANNUAL ROUTINE GYNECOLOGICAL EXAMINATION: ICD-10-CM

## 2024-03-04 PROCEDURE — 77067 SCR MAMMO BI INCL CAD: CPT

## 2024-03-04 PROCEDURE — 77063 BREAST TOMOSYNTHESIS BI: CPT

## 2024-03-25 ENCOUNTER — HOSPITAL ENCOUNTER (OUTPATIENT)
Dept: RADIOLOGY | Facility: CLINIC | Age: 59
Discharge: HOME | End: 2024-03-25
Payer: COMMERCIAL

## 2024-03-25 VITALS — WEIGHT: 158 LBS | HEIGHT: 67 IN | BODY MASS INDEX: 24.8 KG/M2

## 2024-03-25 DIAGNOSIS — R92.8 ABNORMAL SCREENING MAMMOGRAM: ICD-10-CM

## 2024-03-25 PROCEDURE — G0279 TOMOSYNTHESIS, MAMMO: HCPCS

## 2024-03-25 PROCEDURE — 77065 DX MAMMO INCL CAD UNI: CPT

## 2024-03-25 PROCEDURE — 76642 ULTRASOUND BREAST LIMITED: CPT

## 2024-04-02 ENCOUNTER — ANNUAL EXAM (OUTPATIENT)
Dept: OBGYN CLINIC | Facility: CLINIC | Age: 59
End: 2024-04-02
Payer: COMMERCIAL

## 2024-04-02 VITALS — WEIGHT: 174 LBS | BODY MASS INDEX: 27.25 KG/M2 | DIASTOLIC BLOOD PRESSURE: 78 MMHG | SYSTOLIC BLOOD PRESSURE: 124 MMHG

## 2024-04-02 DIAGNOSIS — N81.4 UTERINE PROLAPSE: ICD-10-CM

## 2024-04-02 DIAGNOSIS — Z01.419 WOMEN'S ANNUAL ROUTINE GYNECOLOGICAL EXAMINATION: Primary | ICD-10-CM

## 2024-04-02 PROCEDURE — S0612 ANNUAL GYNECOLOGICAL EXAMINA: HCPCS | Performed by: OBSTETRICS & GYNECOLOGY

## 2024-04-02 NOTE — PROGRESS NOTES
Assessment/Plan:    The patient was informed of a stable menopausal GYN examination.  She does have a history of uterine prolapse this is a change.  This is not interfering with her intimacy or with bladder control.  She is content with her weight.  She feels safe at home she sees a dentist on a regular basis.  She denies any prior depression or anxiety.  She is somewhat stressed taking care of 2 elderly parents.  She should return to my office in 1 year.  She will continue her yearly exam she will continue her yearly mammograms.           Subjective:      Patient ID: Sahra Brown is a 59 y.o. female.    HPI    This is a 59-year-old white female, she is a  2 para 2 with 2 prior vaginal deliveries.  She is now menopausal.  Menopause symptoms are under control.  She still sexually active.  There is no major  or GI complaints.  Colonoscopy is up-to-date.  She is content with her weight.  She feels safe at home.  She sees a dentist on a regular basis.  She denies any problem with depression.  She is still dealing with elderly parents which is somewhat stressful but under control.  She is a history of hypothyroidism which is being treated.  She has a past history of supraventricular tachycardia of this is under control.  Recent Holter monitor was negative.  There are no new major family illnesses to report.  She does not need a Pap smear today.    The following portions of the patient's history were reviewed and updated as appropriate: allergies, current medications, past family history, past medical history, past social history, past surgical history, and problem list.    Review of Systems   All other systems reviewed and are negative.        Objective:      /78   Wt 78.9 kg (174 lb)   LMP 2018 (Exact Date)   BMI 27.25 kg/m²          Physical Exam  Vitals reviewed. Exam conducted with a chaperone present.   Constitutional:       Appearance: Normal appearance. She is normal weight.   HENT:       Head: Normocephalic and atraumatic.      Mouth/Throat:      Mouth: Mucous membranes are moist.   Eyes:      Extraocular Movements: Extraocular movements intact.      Pupils: Pupils are equal, round, and reactive to light.   Cardiovascular:      Rate and Rhythm: Normal rate and regular rhythm.      Pulses: Normal pulses.      Heart sounds: Normal heart sounds.   Pulmonary:      Effort: Pulmonary effort is normal.      Breath sounds: Normal breath sounds.   Chest:   Breasts:     Breasts are symmetrical.      Right: Normal.      Left: Normal.   Abdominal:      General: Abdomen is flat.      Palpations: Abdomen is soft. There is no hepatomegaly or splenomegaly.      Tenderness: There is no abdominal tenderness.      Hernia: No hernia is present. There is no hernia in the umbilical area, ventral area or right inguinal area.   Genitourinary:     General: Normal vulva.      Pubic Area: No rash or pubic lice.       Labia:         Right: No rash, tenderness, lesion or injury.         Left: No rash, tenderness, lesion or injury.       Urethra: No prolapse or urethral pain.      Vagina: Normal. No signs of injury and foreign body. No vaginal discharge, erythema, tenderness, bleeding, lesions or prolapsed vaginal walls.      Cervix: Normal.      Uterus: Normal. With uterine prolapse. Not deviated, not enlarged, not fixed and not tender.       Adnexa: Right adnexa normal and left adnexa normal.      Rectum: Normal.      Comments: The external genitalia normal limits the vagina is clean the cervix and uterus show evidence of prolapse.  The cervix is to the lower part of the vagina.  This is not interfering with intimacy.  Is not interfering with bladder control.  This is present last year.  A Pap smear was not performed.  Musculoskeletal:         General: Normal range of motion.      Cervical back: Normal range of motion and neck supple.   Lymphadenopathy:      Upper Body:      Right upper body: No supraclavicular adenopathy.       Left upper body: No supraclavicular adenopathy.   Skin:     General: Skin is warm and dry.   Neurological:      General: No focal deficit present.      Mental Status: She is alert and oriented to person, place, and time.   Psychiatric:         Mood and Affect: Mood normal.         Behavior: Behavior normal.

## 2024-04-02 NOTE — PATIENT INSTRUCTIONS
The patient was informed of a stable menopausal GYN examination.  Uterine prolapse is again present but not interfering with her lifestyle.  If there is a change or difference we may consider intervention.  She should return to my office in 1 year.  She will continue getting yearly mammograms.  A Pap smear was not performed.

## 2024-11-25 ENCOUNTER — EVALUATION (OUTPATIENT)
Dept: PHYSICAL THERAPY | Age: 59
End: 2024-11-25
Payer: COMMERCIAL

## 2024-11-25 DIAGNOSIS — M54.50 LOW BACK PAIN, UNSPECIFIED BACK PAIN LATERALITY, UNSPECIFIED CHRONICITY, UNSPECIFIED WHETHER SCIATICA PRESENT: Primary | ICD-10-CM

## 2024-11-25 PROCEDURE — 97110 THERAPEUTIC EXERCISES: CPT

## 2024-11-25 PROCEDURE — 97161 PT EVAL LOW COMPLEX 20 MIN: CPT

## 2024-11-25 NOTE — PROGRESS NOTES
PT Evaluation     Today's date: 2024  Patient name: Sahra Brown  : 1965  MRN: [id number]  Referring provider: Sandip Krishna DO  Dx:   Encounter Diagnosis     ICD-10-CM    1. Low back pain, unspecified back pain laterality, unspecified chronicity, unspecified whether sciatica present  M54.50                      Assessment  Impairments: abnormal or restricted ROM, activity intolerance, impaired physical strength, lacks appropriate home exercise program and pain with function  Functional limitations: ambulation and sitting tolerance, bending/lifting    Assessment details: Patient is a 59 year old female presenting to PT for evaluation with c/c of R SI joint region pain, onset ~6 months ago after increased lifting/bending tasks while caring for Father. Evaluation reveals deficits in proximal hip strength/stability, mm flexibility, and lumbar spine mobility. Patient will benefit from skilled outpt PT to address deficits and improve abilities with all functional tasks.     Goals  STG (4 weeks):  1) Education: Patient to demonstrate compliance with attendance of therapy sessions and performance of introductory HEP.   2) Pain: Patient to report day to day pain levels <4/10, allowing for improvements with various functional tasks including sitting and ambulation tolerance.    LTG (8 weeks):   1) Education: Patient to demonstrate compliance with attendance of therapy sessions and performance of progressive HEP, allowing for transition to self-management of symptoms.  2) Pain: Patient to report <1/10 pain during her day to day activities, allowing for improvements with various functional tasks including sitting tolerance.   3) FOTO: Patient to score >/= expected FOTO score, signifying improvements in functional abilities.   4) Strength: Patient to demonstrate 5/5 LE strength B/L, allowing for improvements with all functional tasks.     Plan  Patient would benefit from: skilled physical therapy  Planned  modality interventions: cryotherapy, electrical stimulation/Russian stimulation, TENS and thermotherapy: hydrocollator packs    Planned therapy interventions: activity modification, joint mobilization, IASTM, abdominal trunk stabilization, kinesiology taping, manual therapy, neuromuscular re-education, patient/caregiver education, strengthening, stretching, therapeutic activities, therapeutic exercise, home exercise program, functional ROM exercises, flexibility and balance/weight bearing training    Frequency: 2x week  Duration in weeks: 8  Plan of Care beginning date: 11/25/2024  Plan of Care expiration date: 1/31/2025  Treatment plan discussed with: patient and PTA        Subjective Evaluation    History of Present Illness  Mechanism of injury: Patient presents to PT for evaluation with c/c of R LBP SIJ region, occasionally will travel across lumbar spine. Patient notes symptoms began ~6 months ago - states she was caring for her Dad around the time, performing many transfers. Patient notes her Dad has since passed away, thus not performing these transfers anymore. Patient tries to walk ~5 days per week. Able to do most activities, just with pain. Patient job consists of sitting a lot. Notes has trialed various creams. Patient notes Aleve and Tylenol arthritis will also help, takes as needed. Patient notes sitting for prolonged periods of time will also increase symptoms. Patient notes sleeping is OK. Patient main goals include pain reduction. Patient notes most bothersome in evening and after activity. Denies numbness/tingling symptoms, but notes can occasionally have pain radiate to R thigh region.   Patient Goals  Patient goals for therapy: decreased pain          Objective     Tenderness     Additional Tenderness Details  TTP L5 SP, B/L PSIS, B/L GT    Active Range of Motion     Lumbar   Flexion:  with pain Restriction level: minimal  Extension:  with pain Restriction level: minimal  Left lateral flexion:   WFL and with pain  Right lateral flexion:  with pain Restriction level: moderate  Left rotation:  WFL and with pain  Right rotation:  WFL and with pain  Left Hip   Normal active range of motion    Right Hip   Normal active range of motion    Strength/Myotome Testing     Left Hip   Planes of Motion   Flexion: 4+  Abduction: 4  Adduction: 4+    Right Hip   Planes of Motion   Flexion: 4  Abduction: 4+  Adduction: 4+    Left Knee   Flexion: 5  Extension: 5    Right Knee   Flexion: 5  Extension: 5    Left Ankle/Foot   Dorsiflexion: 5  Great toe extension: 5    Right Ankle/Foot   Dorsiflexion: 5  Great toe extension: 5    Tests     Lumbar   Positive SIJ compression.     Left   Negative passive SLR.     Right   Negative passive SLR.     Left Hip   Positive SKIP.     Right Hip   Positive SKIP.              Precautions: R SIJ pain      Manuals 11/25                                                                Neuro Re-Ed                                                                                                        Ther Ex             Staggered bridge 2 x 10 B/L            S/L clamshell B/L green 2 x 10 B/L            Prone hip extension 2 x 10 B/L            Prone on elbows 3'            Education  5' load management                                                   Ther Activity                                       Gait Training                                       Modalities

## 2024-11-25 NOTE — LETTER
2024    Sandip Krishna DO  [address] [address]    Patient: Sahra Brown   YOB: 1965   Date of Visit: 2024     Encounter Diagnosis     ICD-10-CM    1. Low back pain, unspecified back pain laterality, unspecified chronicity, unspecified whether sciatica present  M54.50           Dear Dr. Krishna:    Thank you for your recent referral of Sahra Brown. Please review the attached evaluation summary from Sahra's recent visit.     Please verify that you agree with the plan of care by signing the attached order.     If you have any questions or concerns, please do not hesitate to call.     I sincerely appreciate the opportunity to share in the care of one of your patients and hope to have another opportunity to work with you in the near future.       Sincerely,    Milton Lee, PT      Referring Provider:      I certify that I have read the below Plan of Care and certify the need for these services furnished under this plan of treatment while under my care.                    Sandip Krishna DO  [address] [address]  Via Fax: [phone]          PT Evaluation     Today's date: 2024  Patient name: Sahra Brown  : 1965  MRN: [id number]  Referring provider: Sandip Krishna DO  Dx:   Encounter Diagnosis     ICD-10-CM    1. Low back pain, unspecified back pain laterality, unspecified chronicity, unspecified whether sciatica present  M54.50                      Assessment  Impairments: abnormal or restricted ROM, activity intolerance, impaired physical strength, lacks appropriate home exercise program and pain with function  Functional limitations: ambulation and sitting tolerance, bending/lifting    Assessment details: Patient is a 59 year old female presenting to PT for evaluation with c/c of R SI joint region pain, onset ~6 months ago after increased lifting/bending tasks while caring for Father.  Evaluation reveals deficits in proximal hip strength/stability, mm flexibility, and lumbar spine mobility. Patient will benefit from skilled outpt PT to address deficits and improve abilities with all functional tasks.     Goals  STG (4 weeks):  1) Education: Patient to demonstrate compliance with attendance of therapy sessions and performance of introductory HEP.   2) Pain: Patient to report day to day pain levels <4/10, allowing for improvements with various functional tasks including sitting and ambulation tolerance.    LTG (8 weeks):   1) Education: Patient to demonstrate compliance with attendance of therapy sessions and performance of progressive HEP, allowing for transition to self-management of symptoms.  2) Pain: Patient to report <1/10 pain during her day to day activities, allowing for improvements with various functional tasks including sitting tolerance.   3) FOTO: Patient to score >/= expected FOTO score, signifying improvements in functional abilities.   4) Strength: Patient to demonstrate 5/5 LE strength B/L, allowing for improvements with all functional tasks.     Plan  Patient would benefit from: skilled physical therapy  Planned modality interventions: cryotherapy, electrical stimulation/Russian stimulation, TENS and thermotherapy: hydrocollator packs    Planned therapy interventions: activity modification, joint mobilization, IASTM, abdominal trunk stabilization, kinesiology taping, manual therapy, neuromuscular re-education, patient/caregiver education, strengthening, stretching, therapeutic activities, therapeutic exercise, home exercise program, functional ROM exercises, flexibility and balance/weight bearing training    Frequency: 2x week  Duration in weeks: 8  Plan of Care beginning date: 11/25/2024  Plan of Care expiration date: 1/31/2025  Treatment plan discussed with: patient and PTA        Subjective Evaluation    History of Present Illness  Mechanism of injury: Patient presents to PT  for evaluation with c/c of R LBP SIJ region, occasionally will travel across lumbar spine. Patient notes symptoms began ~6 months ago - states she was caring for her Dad around the time, performing many transfers. Patient notes her Dad has since passed away, thus not performing these transfers anymore. Patient tries to walk ~5 days per week. Able to do most activities, just with pain. Patient job consists of sitting a lot. Notes has trialed various creams. Patient notes Aleve and Tylenol arthritis will also help, takes as needed. Patient notes sitting for prolonged periods of time will also increase symptoms. Patient notes sleeping is OK. Patient main goals include pain reduction. Patient notes most bothersome in evening and after activity. Denies numbness/tingling symptoms, but notes can occasionally have pain radiate to R thigh region.   Patient Goals  Patient goals for therapy: decreased pain          Objective     Tenderness     Additional Tenderness Details  TTP L5 SP, B/L PSIS, B/L GT    Active Range of Motion     Lumbar   Flexion:  with pain Restriction level: minimal  Extension:  with pain Restriction level: minimal  Left lateral flexion:  WFL and with pain  Right lateral flexion:  with pain Restriction level: moderate  Left rotation:  WFL and with pain  Right rotation:  WFL and with pain  Left Hip   Normal active range of motion    Right Hip   Normal active range of motion    Strength/Myotome Testing     Left Hip   Planes of Motion   Flexion: 4+  Abduction: 4  Adduction: 4+    Right Hip   Planes of Motion   Flexion: 4  Abduction: 4+  Adduction: 4+    Left Knee   Flexion: 5  Extension: 5    Right Knee   Flexion: 5  Extension: 5    Left Ankle/Foot   Dorsiflexion: 5  Great toe extension: 5    Right Ankle/Foot   Dorsiflexion: 5  Great toe extension: 5    Tests     Lumbar   Positive SIJ compression.     Left   Negative passive SLR.     Right   Negative passive SLR.     Left Hip   Positive SKIP.     Right Hip    Positive SKIP.              Precautions: R SIJ pain      Manuals 11/25                                                                Neuro Re-Ed                                                                                                        Ther Ex             Staggered bridge 2 x 10 B/L            S/L clamshell B/L green 2 x 10 B/L            Prone hip extension 2 x 10 B/L            Prone on elbows 3'            Education  5' load management                                                   Ther Activity                                       Gait Training                                       Modalities

## 2024-11-27 ENCOUNTER — OFFICE VISIT (OUTPATIENT)
Dept: PHYSICAL THERAPY | Age: 59
End: 2024-11-27
Payer: COMMERCIAL

## 2024-11-27 DIAGNOSIS — M54.50 LOW BACK PAIN, UNSPECIFIED BACK PAIN LATERALITY, UNSPECIFIED CHRONICITY, UNSPECIFIED WHETHER SCIATICA PRESENT: Primary | ICD-10-CM

## 2024-11-27 PROCEDURE — 97110 THERAPEUTIC EXERCISES: CPT | Performed by: SPECIALIST/TECHNOLOGIST

## 2024-12-02 ENCOUNTER — OFFICE VISIT (OUTPATIENT)
Dept: PHYSICAL THERAPY | Age: 59
End: 2024-12-02
Payer: COMMERCIAL

## 2024-12-02 DIAGNOSIS — M54.50 LOW BACK PAIN, UNSPECIFIED BACK PAIN LATERALITY, UNSPECIFIED CHRONICITY, UNSPECIFIED WHETHER SCIATICA PRESENT: Primary | ICD-10-CM

## 2024-12-02 PROCEDURE — 97530 THERAPEUTIC ACTIVITIES: CPT | Performed by: SPECIALIST/TECHNOLOGIST

## 2024-12-02 PROCEDURE — 97110 THERAPEUTIC EXERCISES: CPT | Performed by: SPECIALIST/TECHNOLOGIST

## 2024-12-02 NOTE — PROGRESS NOTES
Daily Note     Today's date: 2024  Patient name: Sahra Brown  : 1965  MRN: [id number]  Referring provider: Sandip Krishna DO  Dx:   Encounter Diagnosis     ICD-10-CM    1. Low back pain, unspecified back pain laterality, unspecified chronicity, unspecified whether sciatica present  M54.50                      Subjective: Pt reports decreased presence and severity of R sided LBP, more prominent in AM then dissipates throughout the day.     Objective: See treatment diary below    Assessment: Pt with R sided SIJ symptom reproduction with palloff press this visit indicative of decreased core stabilization strength, pt would benefit from continued skilled PT interventions to improve these deficits. Tolerated treatment well. Patient demonstrated fatigue post treatment, exhibited good technique with therapeutic exercises, and would benefit from continued PT    Plan: Continue per plan of care.  Progress treatment as tolerated.         Precautions: R SIJ pain    Manuals                                                               Neuro Re-Ed                                                                                                        Ther Ex             TM Walking  10' 10'           Hip ABd  30# 3x10 35# 3x12          TA Press Downs   20x5s C/L/R 10x5s ea          Palloff Press    7# 15x ea          TA w Iso Hip Add  10x5s           Staggered bridge 2 x 10 B/L 2x10 B/L 2x10 B/L          S/L clamshell B/L green 2 x 10 B/L 2x10 B/L           Prone hip extension 2 x 10 B/L 3x10 B/L 3x10 B/L          Prone on elbows 3' 3 min on wedge  4 min          PPU  2x10 3x10 w OP          Education  5' load management                                                   Ther Activity             Sled Push   0# 3x50ft                       Gait Training                                       Modalities

## 2024-12-05 ENCOUNTER — OFFICE VISIT (OUTPATIENT)
Dept: PHYSICAL THERAPY | Age: 59
End: 2024-12-05
Payer: COMMERCIAL

## 2024-12-05 DIAGNOSIS — M54.50 LOW BACK PAIN, UNSPECIFIED BACK PAIN LATERALITY, UNSPECIFIED CHRONICITY, UNSPECIFIED WHETHER SCIATICA PRESENT: Primary | ICD-10-CM

## 2024-12-05 PROCEDURE — 97110 THERAPEUTIC EXERCISES: CPT | Performed by: SPECIALIST/TECHNOLOGIST

## 2024-12-05 PROCEDURE — 97530 THERAPEUTIC ACTIVITIES: CPT | Performed by: SPECIALIST/TECHNOLOGIST

## 2024-12-05 NOTE — PROGRESS NOTES
Daily Note     Today's date: 2024  Patient name: Sahra Brown  : 1965  MRN: [id number]  Referring provider: Sandip Krishna DO  Dx:   Encounter Diagnosis     ICD-10-CM    1. Low back pain, unspecified back pain laterality, unspecified chronicity, unspecified whether sciatica present  M54.50                      Subjective: Pt reports continued improvement in LBP since starting PT.     Objective: See treatment diary below    Assessment: Pt is responding favorably to lumbar extension movement preference in combination with core stabilization program.  Tolerated treatment well. Patient demonstrated fatigue post treatment, exhibited good technique with therapeutic exercises, and would benefit from continued PT    Plan: Continue per plan of care.  Progress treatment as tolerated.       Precautions: R SIJ pain    Manuals                                                              Neuro Re-Ed                                                                                                        Ther Ex             TM Walking  10' 10'  10'          Hip ABd  30# 3x10 35# 3x12 35# 3x15         TA Press Downs   20x5s C/L/R 10x5s ea 20x C/L/R         Palloff Press    7# 15x ea 6#20x ea         TA w Iso Hip Add  10x5s           Staggered bridge 2 x 10 B/L 2x10 B/L 2x10 B/L HEP         S/L clamshell B/L green 2 x 10 B/L 2x10 B/L           Prone hip extension 2 x 10 B/L 3x10 B/L 3x10 B/L 3x10 B/L         Prone Alt UE/LE    20x ea         Prone on elbows 3' 3 min on wedge  4 min 4 min on wedge         PPU  2x10 3x10 w OP 3x10 w OP         Education  5' load management                                                   Ther Activity             Sled Push   0# 3x50ft 15# 5x50ft                      Gait Training                                       Modalities

## 2024-12-09 ENCOUNTER — OFFICE VISIT (OUTPATIENT)
Dept: PHYSICAL THERAPY | Age: 59
End: 2024-12-09
Payer: COMMERCIAL

## 2024-12-09 DIAGNOSIS — M54.50 LOW BACK PAIN, UNSPECIFIED BACK PAIN LATERALITY, UNSPECIFIED CHRONICITY, UNSPECIFIED WHETHER SCIATICA PRESENT: Primary | ICD-10-CM

## 2024-12-09 PROCEDURE — 97530 THERAPEUTIC ACTIVITIES: CPT | Performed by: SPECIALIST/TECHNOLOGIST

## 2024-12-09 PROCEDURE — 97110 THERAPEUTIC EXERCISES: CPT | Performed by: SPECIALIST/TECHNOLOGIST

## 2024-12-09 NOTE — PROGRESS NOTES
Daily Note     Today's date: 2024  Patient name: Sahra Brown  : 1965  MRN: [id number]  Referring provider: Sandip Krishna DO  Dx:   Encounter Diagnosis     ICD-10-CM    1. Low back pain, unspecified back pain laterality, unspecified chronicity, unspecified whether sciatica present  M54.50                      Subjective: Pt reports she felt good following previous tx session, no soreness. However, pt did a lot of walking over the weekend while out shopping which did cause soreness.     Objective: See treatment diary below    Assessment: Pt would benefit from continued core strengthening, symptoms are consistently decreased since IE however prolonged periods of walking continue to exacerbate symptoms. Tolerated treatment well. Patient demonstrated fatigue post treatment, exhibited good technique with therapeutic exercises, and would benefit from continued PT    Plan: Continue per plan of care.  Progress treatment as tolerated.       Precautions: R SIJ pain    Manuals                                                             Neuro Re-Ed                                                                                                        Ther Ex             TM Walking  10' 10'  10'  10'        Hip ABd  30# 3x10 35# 3x12 35# 3x15 35# 3x15        TA Press Downs   20x5s C/L/R 10x5s ea 20x C/L/R 20x C/L/R        TA Press Down [place]     20# 3x10        Palloff Press    7# 15x ea 6#20x ea 7# 25x ea        TA w Iso Hip Add  10x5s           Staggered bridge 2 x 10 B/L 2x10 B/L 2x10 B/L HEP         S/L clamshell B/L green 2 x 10 B/L 2x10 B/L   Blue for HEP        Prone hip extension 2 x 10 B/L 3x10 B/L 3x10 B/L 3x10 B/L 3x10        Prone Alt UE/LE    20x ea 20x ea        Prone on elbows 3' 3 min on wedge  4 min 4 min on wedge 4' w OP        PPU  2x10 3x10 w OP 3x10 w OP 15x (elbow pain, perform standing) standing       Education  5' load management                                                    Ther Activity             Sled Push   0# 3x50ft 15# 5x50ft 15# 5x50ft push/pull                     Gait Training                                       Modalities

## 2024-12-12 ENCOUNTER — OFFICE VISIT (OUTPATIENT)
Dept: PHYSICAL THERAPY | Age: 59
End: 2024-12-12
Payer: COMMERCIAL

## 2024-12-12 DIAGNOSIS — M54.50 LOW BACK PAIN, UNSPECIFIED BACK PAIN LATERALITY, UNSPECIFIED CHRONICITY, UNSPECIFIED WHETHER SCIATICA PRESENT: Primary | ICD-10-CM

## 2024-12-12 PROCEDURE — 97530 THERAPEUTIC ACTIVITIES: CPT | Performed by: SPECIALIST/TECHNOLOGIST

## 2024-12-12 PROCEDURE — 97110 THERAPEUTIC EXERCISES: CPT | Performed by: SPECIALIST/TECHNOLOGIST

## 2024-12-12 NOTE — PROGRESS NOTES
Daily Note     Today's date: 2024  Patient name: Sahra Brown  : 1965  MRN: [id number]  Referring provider: Sandip Krishna DO  Dx:   Encounter Diagnosis     ICD-10-CM    1. Low back pain, unspecified back pain laterality, unspecified chronicity, unspecified whether sciatica present  M54.50                      Subjective: Pt reports consistently reduced symptoms recently with activity modification and PT.       Objective: See treatment diary below      Assessment: Pt is progressing well with core stabilization program and lumbar extension program, consistently decreased SIJ pain with various movement patterns.  Tolerated treatment well. Patient demonstrated fatigue post treatment, exhibited good technique with therapeutic exercises, and would benefit from continued PT      Plan: Continue per plan of care.  Progress treatment as tolerated.           Precautions: R SIJ pain    Manuals                                                            Neuro Re-Ed                                                                                                        Ther Ex             TM Walking  10' 10'  10'  10' 10'       Leg Press       70# 3x10       Hip ABd  30# 3x10 35# 3x12 35# 3x15 35# 3x15 40# 3x15       TA Press Downs   20x5s C/L/R 10x5s ea 20x C/L/R 20x C/L/R 20x C/L/R       TA Press Down [place]     20# 3x10 21# 3x10       Palloff Press    7# 15x ea 6#20x ea 7# 25x ea 8# 3x10       TA w Iso Hip Add  10x5s           Staggered bridge 2 x 10 B/L 2x10 B/L 2x10 B/L HEP         S/L clamshell B/L green 2 x 10 B/L 2x10 B/L   Blue for HEP        Prone hip extension 2 x 10 B/L 3x10 B/L 3x10 B/L 3x10 B/L 3x10 3x10        Prone Alt UE/LE    20x ea 20x ea 30x ea       Prone on elbows 3' 3 min on wedge  4 min 4 min on wedge 4' w OP 4' w OP       PPU  2x10 3x10 w OP 3x10 w OP 15x (elbow pain, perform standing) standing       Education  5' load management                                                    Ther Activity             Sled Push   0# 3x50ft 15# 5x50ft 15# 5x50ft push/pull 25# 5x50ft push/pull                    Gait Training                                       Modalities

## 2024-12-18 ENCOUNTER — OFFICE VISIT (OUTPATIENT)
Dept: PHYSICAL THERAPY | Age: 59
End: 2024-12-18
Payer: COMMERCIAL

## 2024-12-18 DIAGNOSIS — M54.50 LOW BACK PAIN, UNSPECIFIED BACK PAIN LATERALITY, UNSPECIFIED CHRONICITY, UNSPECIFIED WHETHER SCIATICA PRESENT: Primary | ICD-10-CM

## 2024-12-18 PROCEDURE — 97530 THERAPEUTIC ACTIVITIES: CPT | Performed by: SPECIALIST/TECHNOLOGIST

## 2024-12-18 PROCEDURE — 97110 THERAPEUTIC EXERCISES: CPT | Performed by: SPECIALIST/TECHNOLOGIST

## 2024-12-18 NOTE — PROGRESS NOTES
Daily Note     Today's date: 2024  Patient name: Sahra Brown  : 1965  MRN: [id number]  Referring provider: Sandip Krishna DO  Dx:   Encounter Diagnosis     ICD-10-CM    1. Low back pain, unspecified back pain laterality, unspecified chronicity, unspecified whether sciatica present  M54.50                      Subjective: Pt reports consistent reduction in symptoms between tx sessions.     Objective: See treatment diary below    Assessment: Pt is progressing well with cores stabilization program and lumbar ext ROM is improving consistently.  Tolerated treatment well. Patient demonstrated fatigue post treatment, exhibited good technique with therapeutic exercises, and would benefit from continued PT    Plan: Continue per plan of care.  Progress treatment as tolerated.  Pt may return to walking for exercise gradually.      Precautions: R SIJ pain    Manuals                                                           Neuro Re-Ed                                                                                                        Ther Ex             TM Walking  10' 10'  10'  10' 10' 10'      Leg Press       70# 3x10 75# 3x10      Hip ABd  30# 3x10 35# 3x12 35# 3x15 35# 3x15 40# 3x15 40# 3x15      TA Press Downs   20x5s C/L/R 10x5s ea 20x C/L/R 20x C/L/R 20x C/L/R 20x C/L/R      TA Press Down [place]     20# 3x10 21# 3x10 23# 3x10      Palloff Press    7# 15x ea 6#20x ea 7# 25x ea 8# 3x10 8# 3x10 ea      TA w Iso Hip Add  10x5s           Staggered bridge 2 x 10 B/L 2x10 B/L 2x10 B/L HEP         S/L clamshell B/L green 2 x 10 B/L 2x10 B/L   Blue for HEP        Prone hip extension 2 x 10 B/L 3x10 B/L 3x10 B/L 3x10 B/L 3x10 3x10  3x10 ea      Prone Alt UE/LE    20x ea 20x ea 30x ea 30x ea      Prone on elbows 3' 3 min on wedge  4 min 4 min on wedge 4' w OP 4' w OP 2x3 min w OP      PPU  2x10 3x10 w OP 3x10 w OP 15x (elbow pain, perform standing) standing 20x ea OP       Education  5' load management                                                   Ther Activity             Sled Push   0# 3x50ft 15# 5x50ft 15# 5x50ft push/pull 25# 5x50ft push/pull 25# 8x50ft push/pull                   Gait Training                                       Modalities

## 2024-12-19 ENCOUNTER — OFFICE VISIT (OUTPATIENT)
Dept: PHYSICAL THERAPY | Age: 59
End: 2024-12-19
Payer: COMMERCIAL

## 2024-12-19 DIAGNOSIS — M54.50 LOW BACK PAIN, UNSPECIFIED BACK PAIN LATERALITY, UNSPECIFIED CHRONICITY, UNSPECIFIED WHETHER SCIATICA PRESENT: Primary | ICD-10-CM

## 2024-12-19 PROCEDURE — 97110 THERAPEUTIC EXERCISES: CPT

## 2024-12-19 PROCEDURE — 97530 THERAPEUTIC ACTIVITIES: CPT

## 2024-12-19 NOTE — PROGRESS NOTES
Daily Note     Today's date: 2024  Patient name: Sahra Brown  : 1965  MRN: [id number]  Referring provider: Sandip Krishna DO  Dx:   Encounter Diagnosis     ICD-10-CM    1. Low back pain, unspecified back pain laterality, unspecified chronicity, unspecified whether sciatica present  M54.50                      Subjective: Patient presents to therapy reporting no changes since yesterdays session.       Objective: See treatment diary below      Assessment: Tolerated treatment well. Patient would benefit from continued PT. Interventions continued today to challenge core and lumbar strength, as well as funcitonal mobility. Increased load with performance of sled push/pull to further challenge functional strength, which patient tolerates well. No exacerbation during today's activities. Will continue to progress patient as able to tolerate to improve abilities with all functional tasks.       Plan: Progress treatment as tolerated.       Precautions: R SIJ pain    Manuals                                                          Neuro Re-Ed                                                                                                        Ther Ex             TM Walking  10' 10'  10'  10' 10' 10' 10'     Leg Press       70# 3x10 75# 3x10 75#  3 x 10     Hip ABd  30# 3x10 35# 3x12 35# 3x15 35# 3x15 40# 3x15 40# 3x15 3 x 15  40#     TA Press Downs   20x5s C/L/R 10x5s ea 20x C/L/R 20x C/L/R 20x C/L/R 20x C/L/R 20x  C/L/R     TA Press Down [place]     20# 3x10 21# 3x10 23# 3x10 23#  3 x 10     [place] Press    7# 15x ea 6#20x ea 7# 25x ea 8# 3x10 8# 3x10 ea 8#   3 x 10 ea     TA w Iso Hip Add  10x5s           Staggered bridge 2 x 10 B/L 2x10 B/L 2x10 B/L HEP         S/L clamshell B/L green 2 x 10 B/L 2x10 B/L   Blue for HEP        Prone hip extension 2 x 10 B/L 3x10 B/L 3x10 B/L 3x10 B/L 3x10 3x10  3x10 ea 3 x 10 ea     Prone Alt UE/LE    20x ea 20x ea 30x ea 30x  ea 30x ea     Prone on elbows 3' 3 min on wedge  4 min 4 min on wedge 4' w OP 4' w OP 2x3 min w OP 5'     PPU  2x10 3x10 w OP 3x10 w OP 15x (elbow pain, perform standing) standing 20x ea OP 20x ea OP     Education  5' load management                                                   Ther Activity             Sled Push   0# 3x50ft 15# 5x50ft 15# 5x50ft push/pull 25# 5x50ft push/pull 25# 8x50ft push/pull 35#  5 x 50ft  Push/pull                  Gait Training                                       Modalities

## 2024-12-24 ENCOUNTER — OFFICE VISIT (OUTPATIENT)
Dept: PHYSICAL THERAPY | Age: 59
End: 2024-12-24
Payer: COMMERCIAL

## 2024-12-24 DIAGNOSIS — M54.50 LOW BACK PAIN, UNSPECIFIED BACK PAIN LATERALITY, UNSPECIFIED CHRONICITY, UNSPECIFIED WHETHER SCIATICA PRESENT: Primary | ICD-10-CM

## 2024-12-24 PROCEDURE — 97110 THERAPEUTIC EXERCISES: CPT | Performed by: SPECIALIST/TECHNOLOGIST

## 2024-12-24 PROCEDURE — 97530 THERAPEUTIC ACTIVITIES: CPT | Performed by: SPECIALIST/TECHNOLOGIST

## 2024-12-24 NOTE — PROGRESS NOTES
Daily Note     Today's date: 2024  Patient name: Sahra Brown  : 1965  MRN: [id number]  Referring provider: Sandip Krishna DO  Dx:   Encounter Diagnosis     ICD-10-CM    1. Low back pain, unspecified back pain laterality, unspecified chronicity, unspecified whether sciatica present  M54.50                      Subjective: Doing well, consistent improvement in symptoms recently.       Objective: See treatment diary below      Assessment: Pt able to tolerate increased reps and resistance this visit as well as core stabilization progressions without reproduction of LBP.  Tolerated treatment well. Patient demonstrated fatigue post treatment, exhibited good technique with therapeutic exercises, and would benefit from continued PT      Plan: Continue per plan of care.  Progress treatment as tolerated.       Precautions: R SIJ pain    Manuals                                                         Neuro Re-Ed                                                                                                        Ther Ex             TM Walking  10' 10'  10'  10' 10' 10' 10' 10'     Leg Press       70# 3x10 75# 3x10 75#  3 x 10 80# 35x    Hip ABd  30# 3x10 35# 3x12 35# 3x15 35# 3x15 40# 3x15 40# 3x15 3 x 15  40# 45# 4x10    TA Press Downs   20x5s C/L/R 10x5s ea 20x C/L/R 20x C/L/R 20x C/L/R 20x C/L/R 20x  C/L/R     TA Press Down [place]     20# 3x10 21# 3x10 23# 3x10 23#  3 x 10 23# 30x    Palloff Press    7# 15x ea 6#20x ea 7# 25x ea 8# 3x10 8# 3x10 ea 8#   3 x 10 ea 8# 20x ea    [place] Rotation w Bar         4# 15x    TA w Iso Hip Add  10x5s           Staggered bridge 2 x 10 B/L 2x10 B/L 2x10 B/L HEP         S/L clamshell B/L green 2 x 10 B/L 2x10 B/L   Blue for HEP        Prone hip extension 2 x 10 B/L 3x10 B/L 3x10 B/L 3x10 B/L 3x10 3x10  3x10 ea 3 x 10 ea 30x ea 2#    Prone Alt UE/LE    20x ea 20x ea 30x ea 30x ea 30x ea 30x ea 2# LE Cuffs    Prone on  elbows 3' 3 min on wedge  4 min 4 min on wedge 4' w OP 4' w OP 2x3 min w OP 5'     PPU  2x10 3x10 w OP 3x10 w OP 15x (elbow pain, perform standing) standing 20x ea OP 20x ea OP 3x10 w OP    Education  5' load management                                                   Ther Activity             Sled Push   0# 3x50ft 15# 5x50ft 15# 5x50ft push/pull 25# 5x50ft push/pull 25# 8x50ft push/pull 35#  5 x 50ft  Push/pull 40# 5x50ft                 Gait Training                                       Modalities

## 2024-12-31 ENCOUNTER — OFFICE VISIT (OUTPATIENT)
Dept: PHYSICAL THERAPY | Age: 59
End: 2024-12-31
Payer: COMMERCIAL

## 2024-12-31 DIAGNOSIS — M54.50 LOW BACK PAIN, UNSPECIFIED BACK PAIN LATERALITY, UNSPECIFIED CHRONICITY, UNSPECIFIED WHETHER SCIATICA PRESENT: Primary | ICD-10-CM

## 2024-12-31 PROCEDURE — 97110 THERAPEUTIC EXERCISES: CPT

## 2024-12-31 NOTE — PROGRESS NOTES
Daily Note     Today's date: 2024  Patient name: Sahra Brown  : 1965  MRN: [id number]  Referring provider: Sandip Krishna DO  Dx:   Encounter Diagnosis     ICD-10-CM    1. Low back pain, unspecified back pain laterality, unspecified chronicity, unspecified whether sciatica present  M54.50                      Subjective: Patient presents to PT reporting doing well. Had a bit of pain the other day when it was cloudy/overcast. Has returned to walking ~half of what she had been doing prior to injury, with plans to gradually ramp up to this level.       Objective: See treatment diary below      Assessment: Tolerated treatment well. Patient exhibited good technique with therapeutic exercises. Interventions to improve lumbar mobility, core/lumbar strength, and functional mobility. Patient tolerates interventions well, and has shown good overall improvement in her symptoms. Patient ready for transition to HEP while continuing to gradually increase walking distance. Patient in agreement with plan.       Plan:  Trial HEP, F/U if needed     Precautions: R SIJ pain    Manuals                                                        Neuro Re-Ed                                                                                                        Ther Ex             TM Walking  10' 10'  10'  10' 10' 10' 10' 10'  10'   Leg Press       70# 3x10 75# 3x10 75#  3 x 10 80# 35x 80#  3 x 10   Hip ABd  30# 3x10 35# 3x12 35# 3x15 35# 3x15 40# 3x15 40# 3x15 3 x 15  40# 45# 4x10 45#  3 x 10   TA Press Downs   20x5s C/L/R 10x5s ea 20x C/L/R 20x C/L/R 20x C/L/R 20x C/L/R 20x  C/L/R     TA Press Down [place]     20# 3x10 21# 3x10 23# 3x10 23#  3 x 10 23# 30x 23#  30x   Palloff Press    7# 15x ea 6#20x ea 7# 25x ea 8# 3x10 8# 3x10 ea 8#   3 x 10 ea 8# 20x ea 8#  20x ea   [place] Rotation w Bar         4# 15x 4#  15x   TA w Iso Hip Add  10x5s           Staggered bridge 2 x  10 B/L 2x10 B/L 2x10 B/L HEP         S/L clamshell B/L green 2 x 10 B/L 2x10 B/L   Blue for HEP        Prone hip extension 2 x 10 B/L 3x10 B/L 3x10 B/L 3x10 B/L 3x10 3x10  3x10 ea 3 x 10 ea 30x ea 2# 30x  2@   Prone Alt UE/LE    20x ea 20x ea 30x ea 30x ea 30x ea 30x ea 2# LE Cuffs 30x  2#   Prone on elbows 3' 3 min on wedge  4 min 4 min on wedge 4' w OP 4' w OP 2x3 min w OP 5'  5' wedge   PPU  2x10 3x10 w OP 3x10 w OP 15x (elbow pain, perform standing) standing 20x ea OP 20x ea OP 3x10 w OP 3 x 10 OP   Education  5' load management                                                   Ther Activity             Sled Push   0# 3x50ft 15# 5x50ft 15# 5x50ft push/pull 25# 5x50ft push/pull 25# 8x50ft push/pull 35#  5 x 50ft  Push/pull 40# 5x50ft 40#  5 x 50ft                Gait Training                                       Modalities

## 2025-03-10 ENCOUNTER — TELEPHONE (OUTPATIENT)
Dept: CARDIOLOGY CLINIC | Facility: CLINIC | Age: 60
End: 2025-03-10

## 2025-03-10 NOTE — TELEPHONE ENCOUNTER
Caller: Patient    Doctor: Dr. Fagan    Call back #: [phone]    Reason for call: Patient received VM about needing a referral. Patient restated her member ID number and said that she never needed a referral before for her P[employer] plan. Patient asked if we could run it through her insurance again. I advise patient to check with her insurance company about needing a referral. Patient preferred a call back and asked if our office could run it through her insurance one last time to see if it goes through without a referral. Thank you.

## 2025-03-10 NOTE — TELEPHONE ENCOUNTER
Per karis, patient's insurance requires a referral for appointment with Dr Fagan on 3/18/25. Left voicemail for patient to call insurance/PCP to obtain referral and call back so we can attach it to visit.

## 2025-03-18 ENCOUNTER — OFFICE VISIT (OUTPATIENT)
Dept: CARDIOLOGY CLINIC | Facility: CLINIC | Age: 60
End: 2025-03-18
Payer: COMMERCIAL

## 2025-03-18 VITALS
WEIGHT: 168.6 LBS | HEART RATE: 48 BPM | OXYGEN SATURATION: 99 % | BODY MASS INDEX: 26.46 KG/M2 | SYSTOLIC BLOOD PRESSURE: 142 MMHG | DIASTOLIC BLOOD PRESSURE: 70 MMHG | HEIGHT: 67 IN

## 2025-03-18 DIAGNOSIS — I47.10 SVT (SUPRAVENTRICULAR TACHYCARDIA) (HCC): Primary | ICD-10-CM

## 2025-03-18 DIAGNOSIS — R01.1 SYSTOLIC MURMUR: ICD-10-CM

## 2025-03-18 DIAGNOSIS — R00.0 TACHYCARDIA: ICD-10-CM

## 2025-03-18 DIAGNOSIS — E03.9 ACQUIRED HYPOTHYROIDISM: ICD-10-CM

## 2025-03-18 PROCEDURE — 93000 ELECTROCARDIOGRAM COMPLETE: CPT | Performed by: INTERNAL MEDICINE

## 2025-03-18 PROCEDURE — 99204 OFFICE O/P NEW MOD 45 MIN: CPT | Performed by: INTERNAL MEDICINE

## 2025-03-18 NOTE — ASSESSMENT & PLAN NOTE
The patient has a systolic murmur with a history of mitral valve prolapse.  An echocardiogram will be obtained.

## 2025-03-18 NOTE — PROGRESS NOTES
Name: Sahra Brown      : 1965      MRN: [id number]  Encounter Provider: Megan Fagan MD  Encounter Date: 3/18/2025   Encounter department: [facility] DR. FAGAN  :  Assessment & Plan  SVT (supraventricular tachycardia) (Formerly [facility])  The patient has a history of supraventricular tachycardia now status post ablation therapy in .  The patient is currently complaining of symptoms of palpitation and racing sensation.  I will therefore arrange for the patient to have a ZIO for 2 weeks.  I will also arrange for the patient to have an echocardiogram.  An EKG done today shows ventricular bigeminy I would like to arrange for the patient to have a regular exercise stress test.  Orders:    POCT ECG    Acquired hypothyroidism  Hypothyroidism, stable.       Tachycardia  Patient has a history of racing sensation.  It is not clear if this represented sinus tachycardia or supraventricular tachycardia.  Zio patch will be ordered.  Orders:    POCT ECG    Systolic murmur  The patient has a systolic murmur with a history of mitral valve prolapse.  An echocardiogram will be obtained.           History of Present Illness   The patient presented to this office for the purpose of cardiac reevaluation.  The patient is known to have a history of paroxysmal supraventricular tachycardia status post ablation therapy .  She also has a history of hypothyroidism.  Over the past several weeks the patient has noticed symptoms of palpitation and occasional racing sensation.  Her watch indicated sometimes heart rate as high as 150 bpm.  There is no associating tracing from the watch.  During these symptoms the patient denies any associate symptoms of dizziness or lightheadedness.  She denies any symptom of chest pain or shortness of breath.  She has no leg edema.      Sahra Brown is a 59 y.o. female   History obtained from: patient    Review of Systems   Constitutional: Negative.    Respiratory: Negative.    "  Cardiovascular:  Positive for palpitations. Negative for chest pain and leg swelling.   Psychiatric/Behavioral: Negative.            Objective   /70 (BP Location: Left arm, Patient Position: Sitting, Cuff Size: Standard)   Pulse (!) 48   Ht 5' 7\" (1.702 m)   Wt 76.5 kg (168 lb 9.6 oz)   LMP 07/06/2018 (Exact Date)   SpO2 99%   BMI 26.41 kg/m²      Physical Exam  Vitals reviewed.   Constitutional:       Appearance: Normal appearance.   HENT:      Head: Normocephalic and atraumatic.   Cardiovascular:      Rate and Rhythm: Regular rhythm. Bradycardia present.      Heart sounds: Murmur heard.      Systolic murmur is present with a grade of 2/6.   Musculoskeletal:      Right lower leg: No edema.      Left lower leg: No edema.   Skin:     General: Skin is warm and dry.   Neurological:      Mental Status: She is alert and oriented to person, place, and time.   Psychiatric:         Mood and Affect: Mood normal.         Behavior: Behavior normal.           "

## 2025-03-18 NOTE — PATIENT INSTRUCTIONS
The patient will be scheduled for Zio patch for 2 weeks, echocardiogram and regular exercise stress test.  Present medication will continue to be the same.

## 2025-03-18 NOTE — ASSESSMENT & PLAN NOTE
Patient has a history of racing sensation.  It is not clear if this represented sinus tachycardia or supraventricular tachycardia.  Zio patch will be ordered.  Orders:    POCT ECG

## 2025-04-02 ENCOUNTER — ANNUAL EXAM (OUTPATIENT)
Dept: OBGYN CLINIC | Facility: CLINIC | Age: 60
End: 2025-04-02
Payer: COMMERCIAL

## 2025-04-02 VITALS — WEIGHT: 168 LBS | BODY MASS INDEX: 26.31 KG/M2 | DIASTOLIC BLOOD PRESSURE: 68 MMHG | SYSTOLIC BLOOD PRESSURE: 116 MMHG

## 2025-04-02 DIAGNOSIS — N81.4 UTERINE PROLAPSE: ICD-10-CM

## 2025-04-02 DIAGNOSIS — Z01.419 WOMEN'S ANNUAL ROUTINE GYNECOLOGICAL EXAMINATION: Primary | ICD-10-CM

## 2025-04-02 PROCEDURE — S0612 ANNUAL GYNECOLOGICAL EXAMINA: HCPCS | Performed by: OBSTETRICS & GYNECOLOGY

## 2025-04-02 NOTE — PROGRESS NOTES
Name: Sahra Brown      : 1965      MRN: [id number]  Encounter Provider: Mikal Jang MD  Encounter Date: 2025   Encounter department: [facility]  :  Assessment & Plan  Women's annual routine gynecological examination  The patient was informed of a stable menopausal GYN examination.  She still has uterine prolapse with the cervix to the introitus.  This is not causing any problems with intimacy or bladder control.  If these things would cause those issues we would have her evaluated by urogyn.  A Pap smear was not performed.  She was encouraged to use over-the-counter Estroven for menopausal symptoms including hot flashes and night sweats.  Vaginal dryness is not an issue.  She should return to my office in 1 year.  She will need a Pap smear next year.           History of Present Illness   HPI  Sahra Brown is a 60 y.o. female who presents for annual GYN examination.  She is a  2 para 2 with 2 prior vaginal deliveries.  She is menopausal.  She still sexually active.  She is happily .  She does not need a Pap smear today.  She is content with her weight.  She feels safe at home.  Denies any prior depression or anxiety.  Her PHQ score was 0.  No major  or GI complaint.  Colonoscopies are up.  Her next colonoscopy is due within the next 5 years.  She continues to get yearly mammograms.  She does have a history of heart arrhythmia.  Currently she is wearing a heart monitor which comes off soon.  She has a history of atrial fibrillation.  She also has hypothyroidism medication includes by replacement medication.  Family history reviewed and her father  of dementia recently at age 89.  Her mother still alive and doing well.      Review of Systems   Genitourinary:         Positive for menopausal symptoms   All other systems reviewed and are negative.         Objective   /68   Wt 76.2 kg (168 lb)   LMP 2018 (Exact Date)   BMI 26.31 kg/m²      Physical  Exam  Vitals reviewed. Exam conducted with a chaperone present.   Constitutional:       Appearance: Normal appearance. She is normal weight.   HENT:      Head: Normocephalic and atraumatic.      Nose: Nose normal.      Mouth/Throat:      Mouth: Mucous membranes are moist.   Eyes:      Pupils: Pupils are equal, round, and reactive to light.   Cardiovascular:      Rate and Rhythm: Normal rate and regular rhythm.      Pulses: Normal pulses.      Heart sounds: Normal heart sounds.   Pulmonary:      Effort: Pulmonary effort is normal.      Breath sounds: Normal breath sounds.   Chest:   Breasts:     Right: Normal.      Left: Normal.   Abdominal:      General: Abdomen is flat. Bowel sounds are normal. There is no distension.      Palpations: Abdomen is soft. There is no hepatomegaly, splenomegaly or mass.      Tenderness: There is no abdominal tenderness.      Hernia: No hernia is present. There is no hernia in the left inguinal area or right inguinal area.   Genitourinary:     General: Normal vulva.      Pubic Area: No rash or pubic lice.       Labia:         Right: No rash, tenderness or lesion.         Left: No rash, tenderness or lesion.       Vagina: Normal.      Cervix: Normal.      Uterus: Normal.       Rectum: Normal.      Comments: External genitalia normal limits the vagina is clean the cervix is to the introitus, consistent with uterine prolapse.  She has no bladder issues is not interfering the lifestyle.  She still sexually active.  She was given caution if she has any problems with bladder control but if she should be evaluated.  She is currently she would like to wait and observe I agree with that plan.  A Pap smear was not performed the adnexa clear bilaterally.  Urethra and bladder normal working relationship.  Musculoskeletal:         General: Normal range of motion.      Cervical back: Normal range of motion and neck supple.   Lymphadenopathy:      Lower Body: No right inguinal adenopathy. No left  inguinal adenopathy.   Skin:     General: Skin is warm and dry.   Neurological:      General: No focal deficit present.      Mental Status: She is alert and oriented to person, place, and time.   Psychiatric:         Mood and Affect: Mood normal.         Behavior: Behavior normal.         Thought Content: Thought content normal.

## 2025-04-02 NOTE — PATIENT INSTRUCTIONS
The patient was informed of a stable menopausal GYN examination.  There is evidence of uterine prolapse with the cervix to the introitus.  This is not interfering with her lifestyle and sexual activity.  There is no problem bladder control.  Will continue to watch and be conservative.  She will continue getting yearly mammograms.  She continues to a dentist on a regular basis.  Her colonoscopies are up-to-date.  Family history reviewed.  There is no prior depression or anxiety.  She is content with her weight.  She should return to my office in 1 year unless new issues or problems occur.

## 2025-04-04 ENCOUNTER — HOSPITAL ENCOUNTER (OUTPATIENT)
Dept: RADIOLOGY | Age: 60
Discharge: HOME | End: 2025-04-04
Payer: COMMERCIAL

## 2025-04-04 DIAGNOSIS — Z12.31 ENCOUNTER FOR SCREENING MAMMOGRAM FOR MALIGNANT NEOPLASM OF BREAST: ICD-10-CM

## 2025-04-04 PROCEDURE — 77063 BREAST TOMOSYNTHESIS BI: CPT

## 2025-04-04 PROCEDURE — 77067 SCR MAMMO BI INCL CAD: CPT

## 2025-04-10 ENCOUNTER — HOSPITAL ENCOUNTER (OUTPATIENT)
Dept: NON INVASIVE DIAGNOSTICS | Facility: CLINIC | Age: 60
Discharge: HOME | End: 2025-04-10
Payer: COMMERCIAL

## 2025-04-10 VITALS — OXYGEN SATURATION: 99 % | HEART RATE: 64 BPM | DIASTOLIC BLOOD PRESSURE: 68 MMHG | SYSTOLIC BLOOD PRESSURE: 162 MMHG

## 2025-04-10 VITALS
DIASTOLIC BLOOD PRESSURE: 68 MMHG | HEART RATE: 63 BPM | BODY MASS INDEX: 26.37 KG/M2 | WEIGHT: 168 LBS | HEIGHT: 67 IN | SYSTOLIC BLOOD PRESSURE: 116 MMHG

## 2025-04-10 DIAGNOSIS — I47.10 SVT (SUPRAVENTRICULAR TACHYCARDIA) (HCC): ICD-10-CM

## 2025-04-10 DIAGNOSIS — I10 HYPERTENSIVE RESPONSE TO EXERCISE: ICD-10-CM

## 2025-04-10 DIAGNOSIS — I47.10 SVT (SUPRAVENTRICULAR TACHYCARDIA) (HCC): Primary | ICD-10-CM

## 2025-04-10 LAB
AORTIC ROOT: 3.3 CM
AORTIC VALVE MEAN VELOCITY: 8.5 M/S
ARRHY DURING EX: NORMAL
ASCENDING AORTA: 3.2 CM
AV AREA BY CONTINUOUS VTI: 2 CM2
AV AREA PEAK VELOCITY: 2.1 CM2
AV LVOT MEAN GRADIENT: 3 MMHG
AV LVOT PEAK GRADIENT: 5 MMHG
AV MEAN PRESS GRAD SYS DOP V1V2: 3 MMHG
AV ORIFICE AREA US: 2.03 CM2
AV PEAK GRADIENT: 7 MMHG
AV VELOCITY RATIO: 0.8
AV VMAX SYS DOP: 1.3 M/S
BSA FOR ECHO PROCEDURE: 1.88 M2
CHEST PAIN STATEMENT: NORMAL
DOP CALC AO VTI: 31.64 CM
DOP CALC LVOT AREA: 2.54 CM2
DOP CALC LVOT CARDIAC INDEX: 1.92 L/MIN/M2
DOP CALC LVOT CARDIAC OUTPUT: 3.61 L/MIN
DOP CALC LVOT DIAMETER: 1.8 CM
DOP CALC LVOT PEAK VEL VTI: 25.31 CM
DOP CALC LVOT PEAK VEL: 1.09 M/S
DOP CALC LVOT STROKE INDEX: 34.6 ML/M2
DOP CALC LVOT STROKE VOLUME: 64.37
E WAVE DECELERATION TIME: 216 MS
E/A RATIO: 0.93
FRACTIONAL SHORTENING: 31 (ref 28–44)
INTERVENTRICULAR SEPTUM IN DIASTOLE (PARASTERNAL SHORT AXIS VIEW): 0.9 CM
INTERVENTRICULAR SEPTUM: 0.9 CM (ref 0.6–1.1)
LAAS-AP2: 18 CM2
LAAS-AP4: 16.9 CM2
LEFT ATRIUM SIZE: 3.8 CM
LEFT ATRIUM VOLUME (MOD BIPLANE): 47 ML
LEFT ATRIUM VOLUME INDEX (MOD BIPLANE): 25 ML/M2
LEFT INTERNAL DIMENSION IN SYSTOLE: 2.7 CM (ref 2.1–4)
LEFT VENTRICULAR INTERNAL DIMENSION IN DIASTOLE: 3.9 CM (ref 3.5–6)
LEFT VENTRICULAR POSTERIOR WALL IN END DIASTOLE: 0.8 CM
LEFT VENTRICULAR STROKE VOLUME: 40 ML
LV EF US.2D.A4C+ESTIMATED: 66 %
LVSV (TEICH): 40 ML
MAX DIASTOLIC BP: 72 MMHG
MAX HR PERCENT: 95 %
MAX HR: 179 BPM
MAX PREDICTED HEART RATE: 159 BPM
MV E'TISSUE VEL-LAT: 9 CM/S
MV E'TISSUE VEL-SEP: 7 CM/S
MV PEAK A VEL: 0.92 M/S
MV PEAK E VEL: 86 CM/S
PROTOCOL NAME: NORMAL
RA PRESSURE ESTIMATED: 3 MMHG
RATE PRESSURE PRODUCT: NORMAL
REASON FOR TERMINATION: NORMAL
RIGHT ATRIUM AREA SYSTOLE A4C: 14.3 CM2
RIGHT VENTRICLE ID DIMENSION: 3.8 CM
RV PSP: 31 MMHG
SINOTUBULAR JUNCTION: 3 CM
SL CV LEFT ATRIUM LENGTH A2C: 5 CM
SL CV LV EF: 65
SL CV PED ECHO LEFT VENTRICLE DIASTOLIC VOLUME (MOD BIPLANE) 2D: 67 ML
SL CV PED ECHO LEFT VENTRICLE SYSTOLIC VOLUME (MOD BIPLANE) 2D: 27 ML
SL CV SINUS OF VALSALVA 2D: 3.2 CM
SL CV STRESS RECOVERY BP: NORMAL MMHG
SL CV STRESS RECOVERY HR: 90 BPM
SL CV STRESS RECOVERY O2 SAT: 99 %
SL CV STRESS STAGE REACHED: 3
STJ: 3 CM
STRESS ANGINA INDEX: 0
STRESS BASELINE BP: NORMAL MMHG
STRESS BASELINE HR: 85 BPM
STRESS DUKE TREADMILL SCORE: 4
STRESS O2 SAT REST: 99 %
STRESS PEAK HR: 179 BPM
STRESS POST ESTIMATED WORKLOAD: 10.1 METS
STRESS POST EXERCISE DUR MIN: 9 MIN
STRESS POST EXERCISE DUR MIN: 9 MIN
STRESS POST EXERCISE DUR SEC: 0 SEC
STRESS POST EXERCISE DUR SEC: 0 SEC
STRESS POST O2 SAT PEAK: 97 %
STRESS POST PEAK BP: 228 MMHG
STRESS POST PEAK HR: 184 BPM
STRESS POST PEAK SYSTOLIC BP: 228 MMHG
STRESS ST ELEVATION: 1 MM
TARGET HR FORMULA: NORMAL
TEST INDICATION: NORMAL
TR MAX PG: 28 MMHG
TR PEAK VELOCITY: 2.7 M/S
TRICUSPID ANNULAR PLANE SYSTOLIC EXCURSION: 2.2 CM
TRICUSPID VALVE PEAK REGURGITATION VELOCITY: 2.65 M/S

## 2025-04-10 PROCEDURE — 93306 TTE W/DOPPLER COMPLETE: CPT

## 2025-04-10 PROCEDURE — 93018 CV STRESS TEST I&R ONLY: CPT | Performed by: INTERNAL MEDICINE

## 2025-04-10 PROCEDURE — 93306 TTE W/DOPPLER COMPLETE: CPT | Performed by: INTERNAL MEDICINE

## 2025-04-10 PROCEDURE — 93016 CV STRESS TEST SUPVJ ONLY: CPT | Performed by: INTERNAL MEDICINE

## 2025-04-10 PROCEDURE — 93017 CV STRESS TEST TRACING ONLY: CPT

## 2025-04-10 RX ORDER — METOPROLOL TARTRATE 25 MG/1
25 TABLET, FILM COATED ORAL EVERY 12 HOURS SCHEDULED
Qty: 120 TABLET | Refills: 3 | Status: SHIPPED | OUTPATIENT
Start: 2025-04-10 | End: 2025-06-09

## 2025-04-10 NOTE — PROGRESS NOTES
Patient had 2 episodes of supraventricular tachycardia in the recovery phase after an exercise stress test.    Both episodes broke with maneuvers, first with Valsalva, second with carotid sinus massage.    She was started on metoprolol tartrate 25 mg twice daily    Had ECG changes that could suggest ischemia but with a differential diagnosis of marked hypertension with exercise.    Will perform a stress echocardiogram on the metoprolol both to surveilled for recurrent episodes of SVT brought on by exercise as well as to rule out ischemia and to assess blood pressure response with exercise.

## 2025-04-22 DIAGNOSIS — I48.0 PAF (PAROXYSMAL ATRIAL FIBRILLATION) (HCC): Primary | ICD-10-CM

## 2025-04-29 ENCOUNTER — TRANSCRIBE ORDERS (OUTPATIENT)
Facility: HOSPITAL | Age: 60
End: 2025-04-29

## 2025-04-29 DIAGNOSIS — Z13.9 SCREENING FOR UNSPECIFIED CONDITION: Primary | ICD-10-CM

## 2025-05-01 ENCOUNTER — APPOINTMENT (OUTPATIENT)
Facility: HOSPITAL | Age: 60
End: 2025-05-01

## 2025-05-09 ENCOUNTER — HOSPITAL ENCOUNTER (OUTPATIENT)
Dept: NON INVASIVE DIAGNOSTICS | Facility: HOSPITAL | Age: 60
Discharge: HOME | End: 2025-05-09
Attending: INTERNAL MEDICINE
Payer: COMMERCIAL

## 2025-05-09 VITALS
WEIGHT: 168 LBS | OXYGEN SATURATION: 99 % | SYSTOLIC BLOOD PRESSURE: 134 MMHG | BODY MASS INDEX: 26.37 KG/M2 | DIASTOLIC BLOOD PRESSURE: 84 MMHG | HEIGHT: 67 IN | HEART RATE: 54 BPM

## 2025-05-09 DIAGNOSIS — I48.0 PAF (PAROXYSMAL ATRIAL FIBRILLATION) (HCC): ICD-10-CM

## 2025-05-09 LAB
CHEST PAIN STATEMENT: NORMAL
LV EF US.2D.A4C+ESTIMATED: 63 %
MAX DIASTOLIC BP: 60 MMHG
MAX HR PERCENT: 86 %
MAX HR: 137 BPM
MAX PREDICTED HEART RATE: 159 BPM
PROTOCOL NAME: NORMAL
RATE PRESSURE PRODUCT: NORMAL
REASON FOR TERMINATION: NORMAL
SL CV LV EF: 55
SL CV STRESS RECOVERY BP: NORMAL MMHG
SL CV STRESS RECOVERY HR: 71 BPM
SL CV STRESS RECOVERY O2 SAT: 99 %
SL CV STRESS STAGE REACHED: 4
STRESS ANGINA INDEX: 0
STRESS BASELINE BP: NORMAL MMHG
STRESS BASELINE HR: 54 BPM
STRESS DUKE TREADMILL SCORE: 0
STRESS O2 SAT REST: 99 %
STRESS PEAK HR: 137 BPM
STRESS POST ESTIMATED WORKLOAD: 11.6 METS
STRESS POST EXERCISE DUR MIN: 9 MIN
STRESS POST EXERCISE DUR MIN: 9 MIN
STRESS POST EXERCISE DUR SEC: 55 SEC
STRESS POST EXERCISE DUR SEC: 55 SEC
STRESS POST O2 SAT PEAK: 98 %
STRESS POST PEAK BP: 190 MMHG
STRESS POST PEAK HR: 137 BPM
STRESS POST PEAK SYSTOLIC BP: 190 MMHG
STRESS ST DEPRESSION: 2 MM
TARGET HR FORMULA: NORMAL
TEST INDICATION: NORMAL

## 2025-05-09 PROCEDURE — 93350 STRESS TTE ONLY: CPT

## 2025-05-21 ENCOUNTER — TELEPHONE (OUTPATIENT)
Dept: CARDIOLOGY CLINIC | Facility: CLINIC | Age: 60
End: 2025-05-21

## 2025-05-21 ENCOUNTER — PREP FOR PROCEDURE (OUTPATIENT)
Dept: CARDIOLOGY CLINIC | Facility: CLINIC | Age: 60
End: 2025-05-21

## 2025-05-21 ENCOUNTER — OFFICE VISIT (OUTPATIENT)
Dept: CARDIOLOGY CLINIC | Facility: CLINIC | Age: 60
End: 2025-05-21
Payer: COMMERCIAL

## 2025-05-21 VITALS
DIASTOLIC BLOOD PRESSURE: 76 MMHG | SYSTOLIC BLOOD PRESSURE: 118 MMHG | HEART RATE: 52 BPM | HEIGHT: 67 IN | WEIGHT: 170 LBS | BODY MASS INDEX: 26.68 KG/M2

## 2025-05-21 DIAGNOSIS — R00.0 TACHYCARDIA: ICD-10-CM

## 2025-05-21 DIAGNOSIS — I47.10 SVT (SUPRAVENTRICULAR TACHYCARDIA) (HCC): ICD-10-CM

## 2025-05-21 DIAGNOSIS — I48.0 PAF (PAROXYSMAL ATRIAL FIBRILLATION) (HCC): Primary | ICD-10-CM

## 2025-05-21 DIAGNOSIS — R01.1 SYSTOLIC MURMUR: ICD-10-CM

## 2025-05-21 PROCEDURE — 99244 OFF/OP CNSLTJ NEW/EST MOD 40: CPT | Performed by: INTERNAL MEDICINE

## 2025-05-21 NOTE — LETTER
2025               CARDIAC ABLATION INSTRUCTIONS     Sahra Brown   : 1965  MRN: [id number]  [address]  [place] [address]    Procedure: JOELLE / AFIB & FLUTTER ABLATION / PFA / LOOP IMPLANT      Procedure Date: 25     Location: [facility]  Address: [address] [place] [address], [place] [address]        Labs to be done BETWEEN 25 & 25 : CMP /  CBC (FASTING 8 HOURS)    BLOOD THINNER INSTRUCTIONS (Coumadin / Warfarin / Pradaxa / Eliquis / Xarelto):     Eliquis - Do not take morning of procedure.     Medication Hold:     metoprolol tartrate (LOPRESSOR) -  DO NOT take this medication the night prior to the procedure and in the morning of the procedure.       Arrival Time: The Hospital will contact you the day prior to your procedure, usually between 4PM - 6PM to instruct you on the time and place to report. If you do not hear from a [facility]  by 6PM the evening prior to your procedure, please contact the campus you are scheduled at.     DO NOT drink or eat ANYTHING after midnight the night prior to your procedure. You may have a minimal amount of water with your AM medications.     Arrange for a responsible adult to drive you to and from the hospital.    You should continue to take your morning medications with a sip of water UNLESS ADVISED OTHERWISE.       DO NOT stop taking Plavix or Aspirin unless advised otherwise.     If you are currently taking Fish Oil, Krill oil and/or Vitamin E please DO NOT TAKE FOR A WEEK PRIOR TO PROCEDURE.         If you are diabetic, DO NOT take any of your diabetic pills the morning of your procedure. Oral diabetic medications may include Glucophage, Prandin, Glyburide, Micronase, Avandia, Glucovance, Precose, Glynase, and Starlix.     Bring a list of daily medications, vitamins, minerals, herbals and nutritional supplements you take. Please include dosages and the times you take them each day.     If you are packing an  overnight bag, pack minimal clothing, you will be given hospital sleepwear.   DO NOT bring money, valuables or jewelry. The wedding band is ok.     If you use CPAP machine, bring it to the hospital.      Bring your Photo ID and Insurance cards with you.     Please notify us if you have been admitted to the hospital within 30 days.      FAILURE TO FOLLOW ANY OF THESE INSTRUCTIONS COULD RESULT IN THE CANCELLATION OF YOUR PROCEDURE      Please call [phone] if you do not hear from the  by 6:00PM the night before your procedure.    All patients enter through ENTRANCE B.  Parking is available free of charge or park on Parking Deck B.        Thank you,   Terri Rainey  Surgery Coordinator  [facility]   [address] [place] [id number]  Teams: [phone]

## 2025-05-21 NOTE — PROGRESS NOTES
EPS Consultation/New Patient Evaluation - Sahra Brown 60 y.o. female MRN: [id number]           ASSESSMENT:  1. PAF (paroxysmal atrial fibrillation) ([facility])  POCT ECG      2. SVT (supraventricular tachycardia) ([facility])  POCT ECG      3. Systolic murmur            Monitor reviewed in detail shows atrial fibrillation, atrial flutter, possible NSVT versus antegrade pathway conduction    PLAN:  Paroxysmal atrial fibrillation and atrial flutter.  History of WPW ablation of left lateral pathway. Per report did not conduct rapidly in afib.  D/W Sahra and  options of ablation versus medical management.  There is very little to no room for up-titration of medications given HR in low 50s.  Also given Hx WPW re-studying pathway for recurrent conduction is likely warranted.  Arrangements will be made for PFA of atrial flutter/fibrillation and possible wpw.    CC/HPI:   Prior WPW ablation of left lateral pathway in 2020.  Much less palpitations.  About 7 months ago started having more palpitations.  Getting them off and on then increased.  Metoprolol has helped but not abated the palpitations          Review of Systems   Constitutional: Negative for chills and fever.   HENT:  Negative for congestion and sore throat.    Eyes:  Negative for blurred vision and double vision.   Cardiovascular:  Positive for irregular heartbeat and palpitations. Negative for chest pain, claudication, dyspnea on exertion, leg swelling, near-syncope, orthopnea, paroxysmal nocturnal dyspnea and syncope.   Respiratory:  Negative for cough, shortness of breath and sputum production.    Hematologic/Lymphatic: Negative for bleeding problem. Does not bruise/bleed easily.   Skin:  Negative for itching and rash.   Musculoskeletal:  Negative for arthritis and joint pain.   Gastrointestinal:  Negative for abdominal pain, nausea and vomiting.   Genitourinary:  Negative for hematuria.   Neurological:  Negative for dizziness, focal weakness, headaches,  "light-headedness and weakness.   Psychiatric/Behavioral:  Negative for depression. The patient is not nervous/anxious.          Objective:     Vitals: Blood pressure 118/76, pulse (!) 52, height 5' 7\" (1.702 m), weight 77.1 kg (170 lb), last menstrual period 07/06/2018, not currently breastfeeding., Body mass index is 26.63 kg/m².,        Physical Exam:    GEN: Sahra Brown appears well, alert and oriented x 3, pleasant and cooperative   HEENT: pupils equal, round, and reactive to light; extraocular muscles intact  NECK: supple, no carotid bruits   HEART: regular rhythm, normal S1 and S2, no murmurs, clicks, gallops or rubs   LUNGS: clear to auscultation bilaterally; no wheezes, rales, or rhonchi   ABDOMEN: normal bowel sounds, soft, no tenderness, no distention  EXTREMITIES: peripheral pulses normal; no clubbing, cyanosis, or edema  NEURO: no focal findings   SKIN: normal without suspicious lesions on exposed skin    Medications:    Current Medications[1]     Family History   Problem Relation Name Age of Onset    No Known Problems Mother Olivia     Colon cancer Father  72    Dementia Father      No Known Problems Sister Elva     No Known Problems Daughter Edwige     No Known Problems Daughter Geno     No Known Problems Maternal Grandmother      No Known Problems Maternal Grandfather      No Known Problems Paternal Grandmother      No Known Problems Paternal Grandfather      No Known Problems Maternal Aunt Elzbieta     No Known Problems Paternal Aunt      Cancer Cousin          [place]     Social History     Socioeconomic History    Marital status: /Civil Union     Spouse name: Not on file    Number of children: Not on file    Years of education: Not on file    Highest education level: Not on file   Occupational History    Not on file   Tobacco Use    Smoking status: Never    Smokeless tobacco: Never   Vaping Use    Vaping status: Never Used   Substance and Sexual Activity    Alcohol use: Yes     Comment: " social    Drug use: Never    Sexual activity: Yes     Partners: Male     Birth control/protection: Post-menopausal   Other Topics Concern    Not on file   Social History Narrative    Not on file     Social Drivers of Health     Financial Resource Strain: Not on file   Food Insecurity: Not on file   Transportation Needs: Not on file   Physical Activity: Not on file   Stress: Not on file   Social Connections: Not on file   Intimate Partner Violence: Not on file   Housing Stability: Not on file     Tobacco Use History[2]  Social History     Substance and Sexual Activity   Alcohol Use Yes    Comment: social       Labs & Results:  Below is the patient's most recent value for Albumin, ALT, AST, BUN, Calcium, Chloride, Cholesterol, CO2, Creatinine, GFR, Glucose, HDL, Hematocrit, Hemoglobin, Hemoglobin A1C, LDL, Magnesium, Phosphorus, Platelets, Potassium, PSA, Sodium, Triglycerides, and WBC.   Lab Results   Component Value Date    ALT 20 2025    AST 17 2025    BUN 19 2025    CALCIUM 9.2 2025     2025    CO2 29 2025    CREATININE 0.91 2025    HDL 44 (L) 2025    HCT 44.9 2020    HGB 15.1 2020     2020    K 4.4 2025    TRIG 108 2025    WBC 3.92 (L) 2020     Note: for a comprehensive list of the patient's lab results, access the Results Review activity.            Cardiac testing:   Results for orders placed during the hospital encounter of 20    Echo complete with contrast if indicated    Narrative  [address] [address] [phone]    Transthoracic Echocardiogram  2D, M-mode, Doppler, and Color Doppler    Study date:  2020    Patient: JARAD YEN  MR number: [id number]  Account number: [id number]  : 1965  Age: 54 years  Gender: Female  Status: Outpatient  Location: [address] [place] [address] [facility]  Height: 67 in  Weight: 165 lb  BP: 130/ 70  mmHg    Indications: Tachycardia    Diagnoses: R00.0 - Tachycardia, unspecified    Sonographer:  LIZETTE Rocha  Primary Physician:  Sandip Krishna DO  Referring Physician:  Megan Faagn MD  Group:  [facility]  Interpreting Physician:  Edmar Sharma DO    SUMMARY    LEFT VENTRICLE:  Systolic function was normal. Ejection fraction was estimated to be 60 %.  There were no regional wall motion abnormalities.    TRICUSPID VALVE:  There was mild regurgitation.    HISTORY: PRIOR HISTORY: Tachycardia, hypothyroidism    PROCEDURE: The study was performed in the [address] [place] [address] [facility]. This was a routine study. The transthoracic approach was used. The study included complete 2D imaging, M-mode, complete spectral Doppler, and color Doppler. Image  quality was adequate.    LEFT VENTRICLE: Size was normal. Systolic function was normal. Ejection fraction was estimated to be 60 %. There were no regional wall motion abnormalities. Wall thickness was normal. DOPPLER: Left ventricular diastolic function parameters  were normal.    RIGHT VENTRICLE: The size was normal. Systolic function was normal. Wall thickness was normal.    LEFT ATRIUM: Size was normal.    RIGHT ATRIUM: Size was normal.    MITRAL VALVE: Valve structure was normal. There was normal leaflet separation. DOPPLER: The transmitral velocity was within the normal range. There was no evidence for stenosis. There was no regurgitation.    AORTIC VALVE: The valve was trileaflet. Leaflets exhibited normal thickness and normal cuspal separation. DOPPLER: Transaortic velocity was within the normal range. There was no evidence for stenosis. There was no regurgitation.    TRICUSPID VALVE: The valve structure was normal. There was normal leaflet separation. DOPPLER: The transtricuspid velocity was within the normal range. There was no evidence for stenosis. There was mild regurgitation.    PULMONIC VALVE: Leaflets exhibited normal thickness,  no calcification, and normal cuspal separation. DOPPLER: The transpulmonic velocity was within the normal range. There was no regurgitation.    PERICARDIUM: There was no pericardial effusion. The pericardium was normal in appearance.    AORTA: The root exhibited normal size.    SYSTEMIC VEINS: IVC: The inferior vena cava was normal in size and course. Respirophasic changes were normal.    SYSTEM MEASUREMENT TABLES    2D  %FS: 30.21 %  Ao Diam: 2.47 cm  EDV(Teich): 83.75 ml  EF(Cube): 66.01 %  EF(Teich): 57.82 %  ESV(Cube): 27.31 ml  ESV(Teich): 35.32 ml  IVSd: 0.77 cm  LA Area: 12.04 cm2  LA Diam: 3.07 cm  LVEDV MOD A4C: 106.07 ml  LVEF MOD A4C: 63.45 %  LVESV MOD A4C: 38.77 ml  LVIDd: 4.31 cm  LVIDs: 3.01 cm  LVLd A4C: 7.86 cm  LVLs A4C: 5.93 cm  LVPWd: 0.72 cm  RA Area: 13.91 cm2  RV Diam.: 3.66 cm  SV MOD A4C: 67.3 ml  SV(Cube): 53.03 ml  SV(Teich): 48.43 ml    CW  TR Vmax: 2.25 m/s  TR maxP.26 mmHg    MM  TAPSE: 2.51 cm    PW  E': 0.11 m/s  E/E': 8.62  MV A Hema: 0.78 m/s  MV Dec Allen: 4.02 m/s2  MV DecT: 236.62 ms  MV E Hema: 0.95 m/s  MV E/A Ratio: 1.21    [facility] Accredited Echocardi[facility]    Prepared and electronically signed by    Edmar Sharma DO  Signed 2020 15:23:14    No results found for this or any previous visit.    No results found for this or any previous visit.    No results found for this or any previous visit.                 [1]   Current Outpatient Medications:     apixaban (Eliquis) 5 mg, Take 1 tablet (5 mg total) by mouth 2 (two) times a day, Disp: 60 tablet, Rfl: 6    calcium carbonate (OS-MAGDALENA) 1250 (500 Ca) MG chewable tablet, Chew 1 tablet in the morning., Disp: , Rfl:     Cholecalciferol 50 MCG (2000 UT) TABS, 1 tablet in the morning., Disp: , Rfl:     fluticasone (FLONASE) 50 mcg/act nasal spray, into each nostril, Disp: , Rfl:     levothyroxine 100 mcg tablet, 112 mcg, Disp: , Rfl:     metoprolol tartrate (LOPRESSOR) 25 mg tablet, Take 1  tablet (25 mg total) by mouth every 12 (twelve) hours, Disp: 120 tablet, Rfl: 3    Multiple Vitamin (DAILY VALUE MULTIVITAMIN) TABS, Take by mouth, Disp: , Rfl:   [2]   Social History  Tobacco Use   Smoking Status Never   Smokeless Tobacco Never

## 2025-05-21 NOTE — TELEPHONE ENCOUNTER
Patient scheduled for JOELLE/ AFIB & FLUTTER Ablation / PFA / LOOP  device on 8/6/25  at [facility] with Dr. MCLAUGHLIN.      Patient aware of all general instructions.    Medication holds:     Eliquis - Do not take morning of procedure.       metoprolol tartrate (LOPRESSOR) -  DO NOT take this medication the night prior to the procedure and in the morning of the procedure.     Labs to be done BETWEEN 7/6/25 & 7/30/25   CMP / CBC (FASTING 8 HOURS)      Thank you,  Terri Rainey

## 2025-05-25 PROBLEM — I48.92 ATRIAL FLUTTER BY ELECTROCARDIOGRAM (HCC): Status: ACTIVE | Noted: 2025-05-25

## 2025-05-25 LAB
ATRIAL RATE: 52 BPM
P AXIS: 83 DEGREES
PR INTERVAL: 150 MS
QRS AXIS: 86 DEGREES
QRSD INTERVAL: 92 MS
QT INTERVAL: 450 MS
QTC INTERVAL: 419 MS
T WAVE AXIS: 77 DEGREES
VENTRICULAR RATE: 52 BPM

## 2025-05-25 PROCEDURE — 93000 ELECTROCARDIOGRAM COMPLETE: CPT | Performed by: INTERNAL MEDICINE

## 2025-06-01 DIAGNOSIS — I47.10 SVT (SUPRAVENTRICULAR TACHYCARDIA) (HCC): ICD-10-CM

## 2025-06-01 DIAGNOSIS — I10 HYPERTENSIVE RESPONSE TO EXERCISE: ICD-10-CM

## 2025-06-01 RX ORDER — METOPROLOL TARTRATE 25 MG/1
25 TABLET, FILM COATED ORAL EVERY 12 HOURS SCHEDULED
Qty: 180 TABLET | Refills: 0 | Status: SHIPPED | OUTPATIENT
Start: 2025-06-01 | End: 2025-07-31

## 2025-06-11 ENCOUNTER — HOSPITAL ENCOUNTER (OUTPATIENT)
Dept: RADIOLOGY | Facility: HOSPITAL | Age: 60
Discharge: HOME | End: 2025-06-11
Attending: INTERNAL MEDICINE
Payer: COMMERCIAL

## 2025-06-11 DIAGNOSIS — I48.0 PAF (PAROXYSMAL ATRIAL FIBRILLATION) (HCC): ICD-10-CM

## 2025-06-11 DIAGNOSIS — I47.10 SVT (SUPRAVENTRICULAR TACHYCARDIA) (HCC): ICD-10-CM

## 2025-06-11 PROCEDURE — 75572 CT HRT W/3D IMAGE: CPT

## 2025-06-11 RX ADMIN — IOHEXOL 100 ML: 350 INJECTION, SOLUTION INTRAVENOUS at 09:00

## 2025-07-21 ENCOUNTER — APPOINTMENT (OUTPATIENT)
Dept: LAB | Age: 60
End: 2025-07-21
Attending: INTERNAL MEDICINE
Payer: COMMERCIAL

## 2025-07-21 DIAGNOSIS — I48.0 PAF (PAROXYSMAL ATRIAL FIBRILLATION) (HCC): ICD-10-CM

## 2025-07-21 DIAGNOSIS — I47.10 SVT (SUPRAVENTRICULAR TACHYCARDIA) (HCC): ICD-10-CM

## 2025-07-21 LAB
ALBUMIN SERPL BCG-MCNC: 3.8 G/DL (ref 3.5–5)
ALP SERPL-CCNC: 83 U/L (ref 34–104)
ALT SERPL W P-5'-P-CCNC: 25 U/L (ref 7–52)
ANION GAP SERPL CALCULATED.3IONS-SCNC: 8 MMOL/L (ref 4–13)
AST SERPL W P-5'-P-CCNC: 20 U/L (ref 13–39)
BASOPHILS # BLD AUTO: 0.04 THOUSANDS/ÂΜL (ref 0–0.1)
BASOPHILS NFR BLD AUTO: 1 % (ref 0–1)
BILIRUB SERPL-MCNC: 0.57 MG/DL (ref 0.2–1)
BUN SERPL-MCNC: 18 MG/DL (ref 5–25)
CALCIUM SERPL-MCNC: 8.9 MG/DL (ref 8.4–10.2)
CHLORIDE SERPL-SCNC: 103 MMOL/L (ref 96–108)
CO2 SERPL-SCNC: 29 MMOL/L (ref 21–32)
CREAT SERPL-MCNC: 0.82 MG/DL (ref 0.6–1.3)
EOSINOPHIL # BLD AUTO: 0.12 THOUSAND/ÂΜL (ref 0–0.61)
EOSINOPHIL NFR BLD AUTO: 3 % (ref 0–6)
ERYTHROCYTE [DISTWIDTH] IN BLOOD BY AUTOMATED COUNT: 12.2 % (ref 11.6–15.1)
GFR SERPL CREATININE-BSD FRML MDRD: 77 ML/MIN/1.73SQ M
GLUCOSE P FAST SERPL-MCNC: 87 MG/DL (ref 65–99)
HCT VFR BLD AUTO: 44.4 % (ref 34.8–46.1)
HGB BLD-MCNC: 14.6 G/DL (ref 11.5–15.4)
IMM GRANULOCYTES # BLD AUTO: 0.01 THOUSAND/UL (ref 0–0.2)
IMM GRANULOCYTES NFR BLD AUTO: 0 % (ref 0–2)
LYMPHOCYTES # BLD AUTO: 1.71 THOUSANDS/ÂΜL (ref 0.6–4.47)
LYMPHOCYTES NFR BLD AUTO: 37 % (ref 14–44)
MCH RBC QN AUTO: 29.4 PG (ref 26.8–34.3)
MCHC RBC AUTO-ENTMCNC: 32.9 G/DL (ref 31.4–37.4)
MCV RBC AUTO: 90 FL (ref 82–98)
MONOCYTES # BLD AUTO: 0.48 THOUSAND/ÂΜL (ref 0.17–1.22)
MONOCYTES NFR BLD AUTO: 11 % (ref 4–12)
NEUTROPHILS # BLD AUTO: 2.22 THOUSANDS/ÂΜL (ref 1.85–7.62)
NEUTS SEG NFR BLD AUTO: 48 % (ref 43–75)
NRBC BLD AUTO-RTO: 0 /100 WBCS
PLATELET # BLD AUTO: 266 THOUSANDS/UL (ref 149–390)
PMV BLD AUTO: 10.5 FL (ref 8.9–12.7)
POTASSIUM SERPL-SCNC: 4 MMOL/L (ref 3.5–5.3)
PROT SERPL-MCNC: 6.6 G/DL (ref 6.4–8.4)
RBC # BLD AUTO: 4.96 MILLION/UL (ref 3.81–5.12)
SODIUM SERPL-SCNC: 140 MMOL/L (ref 135–147)
WBC # BLD AUTO: 4.58 THOUSAND/UL (ref 4.31–10.16)

## 2025-07-21 PROCEDURE — 80053 COMPREHEN METABOLIC PANEL: CPT

## 2025-07-21 PROCEDURE — 36415 COLL VENOUS BLD VENIPUNCTURE: CPT

## 2025-07-21 PROCEDURE — 85025 COMPLETE CBC W/AUTO DIFF WBC: CPT

## 2025-08-05 ENCOUNTER — ANESTHESIA EVENT (OUTPATIENT)
Dept: NON INVASIVE DIAGNOSTICS | Facility: HOSPITAL | Age: 60
End: 2025-08-05
Payer: COMMERCIAL

## 2025-08-06 ENCOUNTER — APPOINTMENT (OUTPATIENT)
Dept: NON INVASIVE DIAGNOSTICS | Facility: HOSPITAL | Age: 60
End: 2025-08-06
Attending: INTERNAL MEDICINE
Payer: COMMERCIAL

## 2025-08-06 ENCOUNTER — ANESTHESIA (OUTPATIENT)
Dept: NON INVASIVE DIAGNOSTICS | Facility: HOSPITAL | Age: 60
End: 2025-08-06
Payer: COMMERCIAL

## 2025-08-06 ENCOUNTER — HOSPITAL ENCOUNTER (OUTPATIENT)
Facility: HOSPITAL | Age: 60
Setting detail: OUTPATIENT SURGERY
Discharge: HOME | End: 2025-08-06
Attending: INTERNAL MEDICINE | Admitting: INTERNAL MEDICINE
Payer: COMMERCIAL

## 2025-08-06 VITALS
BODY MASS INDEX: 26.68 KG/M2 | OXYGEN SATURATION: 100 % | DIASTOLIC BLOOD PRESSURE: 63 MMHG | WEIGHT: 170 LBS | RESPIRATION RATE: 17 BRPM | HEART RATE: 59 BPM | HEIGHT: 67 IN | SYSTOLIC BLOOD PRESSURE: 132 MMHG | TEMPERATURE: 97.2 F

## 2025-08-06 DIAGNOSIS — I48.0 PAF (PAROXYSMAL ATRIAL FIBRILLATION) (HCC): ICD-10-CM

## 2025-08-06 DIAGNOSIS — I47.10 SVT (SUPRAVENTRICULAR TACHYCARDIA) (HCC): ICD-10-CM

## 2025-08-06 DIAGNOSIS — R00.0 TACHYCARDIA: ICD-10-CM

## 2025-08-06 PROBLEM — I45.6 WPW (WOLFF-PARKINSON-WHITE SYNDROME): Status: ACTIVE | Noted: 2025-08-06

## 2025-08-06 PROBLEM — I48.92 ATRIAL FIBRILLATION/FLUTTER (HCC): Status: ACTIVE | Noted: 2025-08-06

## 2025-08-06 PROBLEM — I48.91 ATRIAL FIBRILLATION/FLUTTER (HCC): Status: ACTIVE | Noted: 2025-08-06

## 2025-08-06 LAB
ANION GAP SERPL CALCULATED.3IONS-SCNC: 7 MMOL/L (ref 4–13)
ATRIAL RATE: 58 BPM
ATRIAL RATE: 59 BPM
BASOPHILS # BLD AUTO: 0.04 THOUSANDS/ÂΜL (ref 0–0.1)
BASOPHILS NFR BLD AUTO: 1 % (ref 0–1)
BUN SERPL-MCNC: 17 MG/DL (ref 5–25)
CALCIUM SERPL-MCNC: 9.3 MG/DL (ref 8.4–10.2)
CHLORIDE SERPL-SCNC: 104 MMOL/L (ref 96–108)
CO2 SERPL-SCNC: 28 MMOL/L (ref 21–32)
CREAT SERPL-MCNC: 0.87 MG/DL (ref 0.6–1.3)
EOSINOPHIL # BLD AUTO: 0.08 THOUSAND/ÂΜL (ref 0–0.61)
EOSINOPHIL NFR BLD AUTO: 2 % (ref 0–6)
ERYTHROCYTE [DISTWIDTH] IN BLOOD BY AUTOMATED COUNT: 12.3 % (ref 11.6–15.1)
GFR SERPL CREATININE-BSD FRML MDRD: 72 ML/MIN/1.73SQ M
GLUCOSE P FAST SERPL-MCNC: 104 MG/DL (ref 65–99)
GLUCOSE SERPL-MCNC: 104 MG/DL (ref 65–140)
HCT VFR BLD AUTO: 46.9 % (ref 34.8–46.1)
HGB BLD-MCNC: 15.5 G/DL (ref 11.5–15.4)
IMM GRANULOCYTES # BLD AUTO: 0.01 THOUSAND/UL (ref 0–0.2)
IMM GRANULOCYTES NFR BLD AUTO: 0 % (ref 0–2)
INR PPP: 1.12 (ref 0.85–1.19)
KCT BLD-ACNC: 325 SEC (ref 89–137)
KCT BLD-ACNC: 351 SEC (ref 89–137)
KCT BLD-ACNC: 370 SEC (ref 89–137)
KCT BLD-ACNC: 402 SEC (ref 89–137)
KCT BLD-ACNC: 440 SEC (ref 89–137)
LYMPHOCYTES # BLD AUTO: 1.5 THOUSANDS/ÂΜL (ref 0.6–4.47)
LYMPHOCYTES NFR BLD AUTO: 31 % (ref 14–44)
MCH RBC QN AUTO: 29.1 PG (ref 26.8–34.3)
MCHC RBC AUTO-ENTMCNC: 33 G/DL (ref 31.4–37.4)
MCV RBC AUTO: 88 FL (ref 82–98)
MONOCYTES # BLD AUTO: 0.44 THOUSAND/ÂΜL (ref 0.17–1.22)
MONOCYTES NFR BLD AUTO: 9 % (ref 4–12)
NEUTROPHILS # BLD AUTO: 2.84 THOUSANDS/ÂΜL (ref 1.85–7.62)
NEUTS SEG NFR BLD AUTO: 57 % (ref 43–75)
NRBC BLD AUTO-RTO: 0 /100 WBCS
P AXIS: 67 DEGREES
P AXIS: 84 DEGREES
PLATELET # BLD AUTO: 265 THOUSANDS/UL (ref 149–390)
PMV BLD AUTO: 9.4 FL (ref 8.9–12.7)
POTASSIUM SERPL-SCNC: 3.9 MMOL/L (ref 3.5–5.3)
PR INTERVAL: 144 MS
PR INTERVAL: 162 MS
PROTHROMBIN TIME: 14.7 SECONDS (ref 12.3–15)
QRS AXIS: 60 DEGREES
QRS AXIS: 71 DEGREES
QRSD INTERVAL: 64 MS
QRSD INTERVAL: 86 MS
QT INTERVAL: 430 MS
QT INTERVAL: 454 MS
QTC INTERVAL: 423 MS
QTC INTERVAL: 450 MS
RBC # BLD AUTO: 5.32 MILLION/UL (ref 3.81–5.12)
SODIUM SERPL-SCNC: 139 MMOL/L (ref 135–147)
SPECIMEN SOURCE: ABNORMAL
T WAVE AXIS: 68 DEGREES
T WAVE AXIS: 85 DEGREES
VENTRICULAR RATE: 58 BPM
VENTRICULAR RATE: 59 BPM
WBC # BLD AUTO: 4.91 THOUSAND/UL (ref 4.31–10.16)

## 2025-08-06 PROCEDURE — C1893 INTRO/SHEATH, FIXED,NON-PEEL: HCPCS | Performed by: INTERNAL MEDICINE

## 2025-08-06 PROCEDURE — 93655 ICAR CATH ABLTJ DSCRT ARRHYT: CPT | Performed by: INTERNAL MEDICINE

## 2025-08-06 PROCEDURE — C2630 CATH, EP, COOL-TIP: HCPCS | Performed by: INTERNAL MEDICINE

## 2025-08-06 PROCEDURE — 93005 ELECTROCARDIOGRAM TRACING: CPT

## 2025-08-06 PROCEDURE — C1894 INTRO/SHEATH, NON-LASER: HCPCS | Performed by: INTERNAL MEDICINE

## 2025-08-06 PROCEDURE — 80048 BASIC METABOLIC PNL TOTAL CA: CPT | Performed by: PHYSICIAN ASSISTANT

## 2025-08-06 PROCEDURE — C1766 INTRO/SHEATH,STRBLE,NON-PEEL: HCPCS | Performed by: INTERNAL MEDICINE

## 2025-08-06 PROCEDURE — C1730 CATH, EP, 19 OR FEW ELECT: HCPCS | Performed by: INTERNAL MEDICINE

## 2025-08-06 PROCEDURE — 33285 INSJ SUBQ CAR RHYTHM MNTR: CPT | Performed by: INTERNAL MEDICINE

## 2025-08-06 PROCEDURE — C1769 GUIDE WIRE: HCPCS | Performed by: INTERNAL MEDICINE

## 2025-08-06 PROCEDURE — 93623 PRGRMD STIMJ&PACG IV RX NFS: CPT | Performed by: INTERNAL MEDICINE

## 2025-08-06 PROCEDURE — NC001 PR NO CHARGE: Performed by: PHYSICIAN ASSISTANT

## 2025-08-06 PROCEDURE — C1760 CLOSURE DEV, VASC: HCPCS | Performed by: INTERNAL MEDICINE

## 2025-08-06 PROCEDURE — 93010 ELECTROCARDIOGRAM REPORT: CPT | Performed by: INTERNAL MEDICINE

## 2025-08-06 PROCEDURE — C1732 CATH, EP, DIAG/ABL, 3D/VECT: HCPCS | Performed by: INTERNAL MEDICINE

## 2025-08-06 PROCEDURE — C1764 EVENT RECORDER, CARDIAC: HCPCS | Performed by: INTERNAL MEDICINE

## 2025-08-06 PROCEDURE — 76937 US GUIDE VASCULAR ACCESS: CPT | Performed by: INTERNAL MEDICINE

## 2025-08-06 PROCEDURE — 85347 COAGULATION TIME ACTIVATED: CPT

## 2025-08-06 PROCEDURE — C1733 CATH, EP, OTHR THAN COOL-TIP: HCPCS | Performed by: INTERNAL MEDICINE

## 2025-08-06 PROCEDURE — 85025 COMPLETE CBC W/AUTO DIFF WBC: CPT | Performed by: PHYSICIAN ASSISTANT

## 2025-08-06 PROCEDURE — 93656 COMPRE EP EVAL ABLTJ ATR FIB: CPT | Performed by: INTERNAL MEDICINE

## 2025-08-06 PROCEDURE — 93657 TX L/R ATRIAL FIB ADDL: CPT | Performed by: INTERNAL MEDICINE

## 2025-08-06 PROCEDURE — 85610 PROTHROMBIN TIME: CPT | Performed by: PHYSICIAN ASSISTANT

## 2025-08-06 PROCEDURE — C1759 CATH, INTRA ECHOCARDIOGRAPHY: HCPCS | Performed by: INTERNAL MEDICINE

## 2025-08-06 DEVICE — IMPLANTABLE DEVICE: Type: IMPLANTABLE DEVICE | Site: GROIN | Status: FUNCTIONAL

## 2025-08-06 DEVICE — IMPLANTABLE DEVICE: Type: IMPLANTABLE DEVICE | Site: CHEST  WALL | Status: FUNCTIONAL

## 2025-08-06 RX ORDER — ROCURONIUM BROMIDE 10 MG/ML
INJECTION, SOLUTION INTRAVENOUS AS NEEDED
Status: DISCONTINUED | OUTPATIENT
Start: 2025-08-06 | End: 2025-08-06

## 2025-08-06 RX ORDER — MIDAZOLAM HYDROCHLORIDE 2 MG/2ML
INJECTION, SOLUTION INTRAMUSCULAR; INTRAVENOUS AS NEEDED
Status: DISCONTINUED | OUTPATIENT
Start: 2025-08-06 | End: 2025-08-06

## 2025-08-06 RX ORDER — LIDOCAINE HYDROCHLORIDE 10 MG/ML
INJECTION, SOLUTION EPIDURAL; INFILTRATION; INTRACAUDAL; PERINEURAL CODE/TRAUMA/SEDATION MEDICATION
Status: DISCONTINUED | OUTPATIENT
Start: 2025-08-06 | End: 2025-08-06 | Stop reason: HOSPADM

## 2025-08-06 RX ORDER — GLYCOPYRROLATE 0.2 MG/ML
INJECTION INTRAMUSCULAR; INTRAVENOUS AS NEEDED
Status: DISCONTINUED | OUTPATIENT
Start: 2025-08-06 | End: 2025-08-06

## 2025-08-06 RX ORDER — METOPROLOL SUCCINATE 25 MG/1
12.5 TABLET, EXTENDED RELEASE ORAL 2 TIMES DAILY
Qty: 90 TABLET | Refills: 0 | Status: SHIPPED | OUTPATIENT
Start: 2025-08-06 | End: 2025-11-04

## 2025-08-06 RX ORDER — DEXAMETHASONE SODIUM PHOSPHATE 10 MG/ML
INJECTION, SOLUTION INTRAMUSCULAR; INTRAVENOUS AS NEEDED
Status: DISCONTINUED | OUTPATIENT
Start: 2025-08-06 | End: 2025-08-06

## 2025-08-06 RX ORDER — PROTAMINE SULFATE 10 MG/ML
INJECTION, SOLUTION INTRAVENOUS AS NEEDED
Status: DISCONTINUED | OUTPATIENT
Start: 2025-08-06 | End: 2025-08-06

## 2025-08-06 RX ORDER — ACETAMINOPHEN 325 MG/1
650 TABLET ORAL EVERY 4 HOURS PRN
OUTPATIENT
Start: 2025-08-06

## 2025-08-06 RX ORDER — ADENOSINE 3 MG/ML
INJECTION INTRAVENOUS CODE/TRAUMA/SEDATION MEDICATION
Status: DISCONTINUED | OUTPATIENT
Start: 2025-08-06 | End: 2025-08-06 | Stop reason: HOSPADM

## 2025-08-06 RX ORDER — FENTANYL CITRATE/PF 50 MCG/ML
25 SYRINGE (ML) INJECTION
Refills: 0 | Status: CANCELLED | OUTPATIENT
Start: 2025-08-06

## 2025-08-06 RX ORDER — HEPARIN SODIUM 1000 [USP'U]/ML
INJECTION, SOLUTION INTRAVENOUS; SUBCUTANEOUS CODE/TRAUMA/SEDATION MEDICATION
Status: DISCONTINUED | OUTPATIENT
Start: 2025-08-06 | End: 2025-08-06 | Stop reason: HOSPADM

## 2025-08-06 RX ORDER — PROPOFOL 10 MG/ML
INJECTION, EMULSION INTRAVENOUS AS NEEDED
Status: DISCONTINUED | OUTPATIENT
Start: 2025-08-06 | End: 2025-08-06

## 2025-08-06 RX ORDER — LIDOCAINE HYDROCHLORIDE 10 MG/ML
INJECTION, SOLUTION EPIDURAL; INFILTRATION; INTRACAUDAL; PERINEURAL AS NEEDED
Status: DISCONTINUED | OUTPATIENT
Start: 2025-08-06 | End: 2025-08-06

## 2025-08-06 RX ORDER — DIPHENHYDRAMINE HYDROCHLORIDE 50 MG/ML
12.5 INJECTION, SOLUTION INTRAMUSCULAR; INTRAVENOUS ONCE AS NEEDED
Status: CANCELLED | OUTPATIENT
Start: 2025-08-06

## 2025-08-06 RX ORDER — FENTANYL CITRATE 50 UG/ML
INJECTION, SOLUTION INTRAMUSCULAR; INTRAVENOUS AS NEEDED
Status: DISCONTINUED | OUTPATIENT
Start: 2025-08-06 | End: 2025-08-06

## 2025-08-06 RX ORDER — SODIUM CHLORIDE 9 MG/ML
20 INJECTION, SOLUTION INTRAVENOUS ONCE
Status: COMPLETED | OUTPATIENT
Start: 2025-08-06 | End: 2025-08-06

## 2025-08-06 RX ORDER — SODIUM CHLORIDE, SODIUM LACTATE, POTASSIUM CHLORIDE, CALCIUM CHLORIDE 600; 310; 30; 20 MG/100ML; MG/100ML; MG/100ML; MG/100ML
INJECTION, SOLUTION INTRAVENOUS CONTINUOUS PRN
Status: DISCONTINUED | OUTPATIENT
Start: 2025-08-06 | End: 2025-08-06

## 2025-08-06 RX ORDER — ALBUTEROL SULFATE 0.83 MG/ML
2.5 SOLUTION RESPIRATORY (INHALATION) ONCE AS NEEDED
Status: CANCELLED | OUTPATIENT
Start: 2025-08-06

## 2025-08-06 RX ADMIN — ROCURONIUM 10 MG: 50 INJECTION, SOLUTION INTRAVENOUS at 13:04

## 2025-08-06 RX ADMIN — FENTANYL CITRATE 25 MCG: 50 INJECTION INTRAMUSCULAR; INTRAVENOUS at 13:37

## 2025-08-06 RX ADMIN — ROCURONIUM 50 MG: 50 INJECTION, SOLUTION INTRAVENOUS at 10:44

## 2025-08-06 RX ADMIN — ROCURONIUM 20 MG: 50 INJECTION, SOLUTION INTRAVENOUS at 11:53

## 2025-08-06 RX ADMIN — PROTAMINE SULFATE 50 MG: 10 INJECTION, SOLUTION INTRAVENOUS at 14:11

## 2025-08-06 RX ADMIN — PHENYLEPHRINE HYDROCHLORIDE 40 MCG/MIN: 10 INJECTION INTRAVENOUS at 10:47

## 2025-08-06 RX ADMIN — SODIUM CHLORIDE 20 ML/HR: 0.9 INJECTION, SOLUTION INTRAVENOUS at 07:48

## 2025-08-06 RX ADMIN — PROPOFOL 50 MCG/KG/MIN: 10 INJECTION, EMULSION INTRAVENOUS at 10:47

## 2025-08-06 RX ADMIN — PROPOFOL 150 MG: 10 INJECTION, EMULSION INTRAVENOUS at 10:44

## 2025-08-06 RX ADMIN — SUGAMMADEX 200 MG: 100 INJECTION, SOLUTION INTRAVENOUS at 14:21

## 2025-08-06 RX ADMIN — ROCURONIUM 20 MG: 50 INJECTION, SOLUTION INTRAVENOUS at 11:20

## 2025-08-06 RX ADMIN — LIDOCAINE HYDROCHLORIDE 50 MG: 10 INJECTION, SOLUTION EPIDURAL; INFILTRATION; INTRACAUDAL; PERINEURAL at 10:44

## 2025-08-06 RX ADMIN — SODIUM CHLORIDE, SODIUM LACTATE, POTASSIUM CHLORIDE, AND CALCIUM CHLORIDE: .6; .31; .03; .02 INJECTION, SOLUTION INTRAVENOUS at 10:33

## 2025-08-06 RX ADMIN — PROPOFOL 50 MG: 10 INJECTION, EMULSION INTRAVENOUS at 13:43

## 2025-08-06 RX ADMIN — GLYCOPYRROLATE 0.2 MG: 0.2 INJECTION INTRAMUSCULAR; INTRAVENOUS at 13:08

## 2025-08-06 RX ADMIN — DEXAMETHASONE SODIUM PHOSPHATE 5 MG: 10 INJECTION, SOLUTION INTRAMUSCULAR; INTRAVENOUS at 14:09

## 2025-08-06 RX ADMIN — FENTANYL CITRATE 25 MCG: 50 INJECTION INTRAMUSCULAR; INTRAVENOUS at 13:10

## 2025-08-06 RX ADMIN — FENTANYL CITRATE 50 MCG: 50 INJECTION INTRAMUSCULAR; INTRAVENOUS at 10:44

## 2025-08-06 RX ADMIN — ROCURONIUM 10 MG: 50 INJECTION, SOLUTION INTRAVENOUS at 12:22

## 2025-08-06 RX ADMIN — SODIUM CHLORIDE, SODIUM LACTATE, POTASSIUM CHLORIDE, AND CALCIUM CHLORIDE: .6; .31; .03; .02 INJECTION, SOLUTION INTRAVENOUS at 12:52

## 2025-08-06 RX ADMIN — MIDAZOLAM 2 MG: 1 INJECTION INTRAMUSCULAR; INTRAVENOUS at 10:32

## 2025-08-20 ENCOUNTER — IN-CLINIC DEVICE VISIT (OUTPATIENT)
Dept: CARDIOLOGY CLINIC | Facility: CLINIC | Age: 60
End: 2025-08-20
Payer: COMMERCIAL

## 2025-08-20 DIAGNOSIS — I48.0 PAF (PAROXYSMAL ATRIAL FIBRILLATION) (HCC): Primary | ICD-10-CM

## 2025-08-20 PROCEDURE — 93291 INTERROG DEV EVAL SCRMS IP: CPT | Performed by: INTERNAL MEDICINE

## (undated) DEVICE — Device

## (undated) DEVICE — SHEATH STEERABLE FARADRIVE

## (undated) DEVICE — CATH ULTRASOUND ACUNAV ICE 8FR 90CM GE VIVID-I

## (undated) DEVICE — CATH ABLATION FARAWAVE PULSED FIELD 31MM

## (undated) DEVICE — CABLE CATH CONNECTION FARASTAR